# Patient Record
Sex: FEMALE | Race: WHITE | NOT HISPANIC OR LATINO | Employment: OTHER | URBAN - METROPOLITAN AREA
[De-identification: names, ages, dates, MRNs, and addresses within clinical notes are randomized per-mention and may not be internally consistent; named-entity substitution may affect disease eponyms.]

---

## 2017-01-04 ENCOUNTER — ALLSCRIPTS OFFICE VISIT (OUTPATIENT)
Dept: OTHER | Facility: OTHER | Age: 77
End: 2017-01-04

## 2017-01-04 ENCOUNTER — TRANSCRIBE ORDERS (OUTPATIENT)
Dept: ADMINISTRATIVE | Facility: HOSPITAL | Age: 77
End: 2017-01-04

## 2017-01-04 DIAGNOSIS — I71.2 THORACIC ANEURYSM WITHOUT MENTION OF RUPTURE: Primary | ICD-10-CM

## 2017-02-01 ENCOUNTER — GENERIC CONVERSION - ENCOUNTER (OUTPATIENT)
Dept: OTHER | Facility: OTHER | Age: 77
End: 2017-02-01

## 2017-02-04 DIAGNOSIS — I71.2 THORACIC AORTIC ANEURYSM WITHOUT RUPTURE (HCC): ICD-10-CM

## 2017-03-22 ENCOUNTER — HOSPITAL ENCOUNTER (OUTPATIENT)
Dept: CT IMAGING | Facility: HOSPITAL | Age: 77
Discharge: HOME | End: 2017-03-22
Attending: SURGERY
Payer: MEDICARE

## 2017-03-22 DIAGNOSIS — I71.2 THORACIC AORTIC ANEURYSM WITHOUT RUPTURE (HCC): ICD-10-CM

## 2017-03-22 PROCEDURE — 71250 CT THORAX DX C-: CPT

## 2017-04-19 ENCOUNTER — HOSPITAL ENCOUNTER (OUTPATIENT)
Dept: NON INVASIVE DIAGNOSTICS | Facility: CLINIC | Age: 77
Discharge: HOME | End: 2017-04-19
Payer: MEDICARE

## 2017-04-19 DIAGNOSIS — I71.2 THORACIC AORTIC ANEURYSM WITHOUT RUPTURE (HCC): ICD-10-CM

## 2017-04-19 PROCEDURE — 93978 VASCULAR STUDY: CPT

## 2017-05-10 ENCOUNTER — ALLSCRIPTS OFFICE VISIT (OUTPATIENT)
Dept: OTHER | Facility: OTHER | Age: 77
End: 2017-05-10

## 2017-06-14 ENCOUNTER — GENERIC CONVERSION - ENCOUNTER (OUTPATIENT)
Dept: OTHER | Facility: OTHER | Age: 77
End: 2017-06-14

## 2017-09-05 ENCOUNTER — GENERIC CONVERSION - ENCOUNTER (OUTPATIENT)
Dept: OTHER | Facility: OTHER | Age: 77
End: 2017-09-05

## 2017-11-10 DIAGNOSIS — I71.2 THORACIC AORTIC ANEURYSM WITHOUT RUPTURE (HCC): ICD-10-CM

## 2017-11-10 DIAGNOSIS — I65.23 OCCLUSION AND STENOSIS OF BILATERAL CAROTID ARTERIES: ICD-10-CM

## 2017-12-04 ENCOUNTER — GENERIC CONVERSION - ENCOUNTER (OUTPATIENT)
Dept: OTHER | Facility: OTHER | Age: 77
End: 2017-12-04

## 2017-12-04 ENCOUNTER — HOSPITAL ENCOUNTER (OUTPATIENT)
Dept: NON INVASIVE DIAGNOSTICS | Facility: CLINIC | Age: 77
Discharge: HOME | End: 2017-12-04
Payer: MEDICARE

## 2017-12-04 DIAGNOSIS — I65.23 OCCLUSION AND STENOSIS OF BILATERAL CAROTID ARTERIES: ICD-10-CM

## 2017-12-04 DIAGNOSIS — I71.2 THORACIC AORTIC ANEURYSM WITHOUT RUPTURE (HCC): ICD-10-CM

## 2017-12-04 PROCEDURE — 93880 EXTRACRANIAL BILAT STUDY: CPT

## 2018-01-11 NOTE — MISCELLANEOUS
Message  patient called with back pain for last 4 days, she claims this back pain is different than other times  I spoke to [facility] - LASHANDA LOVELL and she said unlikely that pain coming from AAA, but if continues or not relieved by pain meds, she should go to ER  I told patient   Active Problems    1  Abdominal aortic aneurysm without rupture (441 4) (I71 4)   2  Chronic kidney disease, stage III (moderate) (585 3) (N18 3)   3  History of AAA (abdominal aortic aneurysm) repair (V45 89) (Z98 89)   4  Thoracic aortic aneurysm without rupture (441 2) (I71 2)    Current Meds   1  AmLODIPine Besylate 5 MG Oral Tablet; Therapy: (Recorded:09Nov2015) to Recorded   2  Avapro 150 MG Oral Tablet (Irbesartan); Therapy: (Recorded:09Nov2015) to Recorded   3  Baclofen TABS; Therapy: ([phone]) to Recorded   4  Calcitriol 0 25 MCG Oral Capsule; Therapy: 28Dec2015 to Recorded   5  CoQ-10 CAPS; Therapy: (66 [phone] 64 22) to Recorded   6  FentaNYL 12 MCG/HR Transdermal Patch 72 Hour; Therapy: ([phone]) to Recorded   7  Fish Oil CAPS; Therapy: (66 [phone] 64 22) to Recorded   8  Lysine HCl - 1000 MG Oral Tablet; Therapy: (Recorded:11Jan2016) to Recorded   9  Metoprolol Tartrate 25 MG Oral Tablet; Therapy: ([phone]) to Recorded   10  Oxycodone-Acetaminophen 5-325 MG Oral Tablet; Therapy: ([phone]) to Recorded   11  Pantoprazole Sodium 40 MG PACK; Therapy: ([phone]) to Recorded   12  Rocaltrol CAPS (Calcitriol); Therapy: ([phone]) to Recorded   13  Sertraline HCl - 50 MG Oral Tablet; Therapy: ([phone]) to Recorded   14  Vitamin B Complex TABS; Therapy: (66 [phone] 64 22) to Recorded   15  Vitamin C CAPS; Therapy: (66 411 64 22) to Recorded   16  Vitamin D CAPS; Therapy: (66 411 64 22) to Recorded   17  Zovirax 400 MG Oral Tablet (Acyclovir); Therapy: (Recorded:09Nov2015) to Recorded    Allergies    1   Influenza Virus Vaccine Split   2  Iodine Crystals   3  Iodine SOLN   4  Levaquin TABS   5  NexIUM PACK   6  Rituxan   7  Sulfa Drugs    8  Adhesive Tape   9  Eggs   10  IV Dye   11  Latex   12  Other   13  Shellfish   14  Tape    Signatures   Electronically signed by :  Derrell Lopez, ; May 27 2016  2:05PM EST                       (Author)

## 2018-01-14 VITALS
BODY MASS INDEX: 31.99 KG/M2 | HEART RATE: 62 BPM | HEIGHT: 65 IN | RESPIRATION RATE: 16 BRPM | WEIGHT: 192 LBS | SYSTOLIC BLOOD PRESSURE: 130 MMHG | DIASTOLIC BLOOD PRESSURE: 80 MMHG

## 2018-01-18 NOTE — MISCELLANEOUS
Message  pt called ? if cleared for cta  informed her Dr Nel Marsh did not clear her, Dr Bonnie Toro is currently out of the office and we have sent him a task informing of same  she ? if she was discussed at case conference, Dr Bonnie Toro said she would be, maybe Dr Shara Taylor can advise on plan  She states she would rather go on dialysis then have a stroke or ruptured aneurysm  s/w Dr Shara Taylor - he rev films, Dr Bonnie Toro was not here monday and she was not discussed at case conf, he recommends pt wait until Dr Bonnie Toro rev neph recommendations and we will f/u w/ her next week  Pt notified of same  Active Problems    1  Abdominal aortic aneurysm, without rupture (441 4) (I71 4)   2  Carotid stenosis, asymptomatic, bilateral (433 10,433 30) (I65 23)   3  Chronic kidney disease, stage III (moderate) (585 3) (N18 3)   4  History of AAA (abdominal aortic aneurysm) repair (V45 89) (Z98 890)   5  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) (I77 1)   6  Thoracic aortic aneurysm without rupture (441 2) (I71 2)    Current Meds   1  AmLODIPine Besylate 2 5 MG Oral Tablet; Therapy: (Recorded:02Nov2016) to Recorded   2  Avapro 150 MG Oral Tablet (Irbesartan); Therapy: (Recorded:09Nov2015) to Recorded   3  Biotin 2500 MCG Oral Capsule; Therapy: (Recorded:02Nov2016) to Recorded   4  CoQ-10 CAPS; [facility] to Recorded   5  FentaNYL 12 MCG/HR Transdermal Patch 72 Hour; Therapy: ([phone]) to Recorded   6  FentaNYL 25 MCG/HR Transdermal Patch 72 Hour; Therapy: [id number] to Recorded   7  Fish Oil CAPS; Therapy: (Elsie Lowe) to Recorded   8  Lysine HCl - 1000 MG Oral Tablet; Therapy: (Recorded:11Jan2016) to Recorded   9  Metoprolol Tartrate 25 MG Oral Tablet; Therapy: ([phone]) to Recorded   10  Oxycodone-Acetaminophen 5-325 MG Oral Tablet; Therapy: ([phone]) to Recorded   11  Pepcid 20 MG Oral Tablet (Famotidine);     Therapy: (Recorded:02Nov2016) to Recorded   12  Rocaltrol CAPS (Calcitriol); Therapy: ([id number]) to Recorded   13  Sertraline HCl - 50 MG Oral Tablet; Therapy: ([id number]) to Recorded   14  Vitamin B Complex TABS; Therapy: ([phone]) to Recorded   15  Vitamin C CAPS; Therapy: ([phone]) to Recorded   16  Vitamin D CAPS; Therapy: ([phone]) to Recorded   17  Zovirax 400 MG Oral Tablet (Acyclovir); Therapy: (Recorded:09Nov2015) to Recorded    Allergies    1  Influenza Virus Vaccine Split   2  Iodine Crystals   3  Iodine SOLN   4  Levaquin TABS   5  NexIUM PACK   6  Rituxan   7  Sulfa Drugs    8  Adhesive Tape   9  Eggs   10  IV Dye   11  Latex   12  Other   13  Shellfish   14   Tape    Signatures   Electronically signed by : Viry Panda, ; Nov 10 2016  4:22PM EST                       (Author)

## 2018-05-17 DIAGNOSIS — I71.4 ABDOMINAL AORTIC ANEURYSM WITHOUT RUPTURE (HCC): Primary | ICD-10-CM

## 2018-08-10 ENCOUNTER — HOSPITAL ENCOUNTER (OUTPATIENT)
Dept: NON INVASIVE DIAGNOSTICS | Facility: CLINIC | Age: 78
Discharge: HOME | End: 2018-08-10
Payer: MEDICARE

## 2018-08-10 DIAGNOSIS — I71.4 ABDOMINAL AORTIC ANEURYSM WITHOUT RUPTURE (HCC): ICD-10-CM

## 2018-08-10 PROCEDURE — 93978 VASCULAR STUDY: CPT

## 2018-08-14 PROCEDURE — 93978 VASCULAR STUDY: CPT | Performed by: SURGERY

## 2018-08-22 ENCOUNTER — TELEPHONE (OUTPATIENT)
Dept: VASCULAR SURGERY | Facility: CLINIC | Age: 78
End: 2018-08-22

## 2018-08-22 DIAGNOSIS — I65.23 BILATERAL CAROTID ARTERY STENOSIS: Primary | ICD-10-CM

## 2018-08-29 ENCOUNTER — OFFICE VISIT (OUTPATIENT)
Dept: VASCULAR SURGERY | Facility: CLINIC | Age: 78
End: 2018-08-29
Payer: MEDICARE

## 2018-08-29 VITALS
BODY MASS INDEX: 33.15 KG/M2 | SYSTOLIC BLOOD PRESSURE: 130 MMHG | WEIGHT: 199 LBS | DIASTOLIC BLOOD PRESSURE: 78 MMHG | HEIGHT: 65 IN | TEMPERATURE: 98.9 F

## 2018-08-29 DIAGNOSIS — K55.1 CHRONIC MESENTERIC ISCHEMIA (HCC): ICD-10-CM

## 2018-08-29 DIAGNOSIS — K55.1 SUPERIOR MESENTERIC ARTERY STENOSIS (HCC): ICD-10-CM

## 2018-08-29 DIAGNOSIS — I71.4 ABDOMINAL AORTIC ANEURYSM (AAA) WITHOUT RUPTURE (HCC): Primary | ICD-10-CM

## 2018-08-29 DIAGNOSIS — I65.23 CAROTID STENOSIS, ASYMPTOMATIC, BILATERAL: ICD-10-CM

## 2018-08-29 DIAGNOSIS — N18.4 CKD (CHRONIC KIDNEY DISEASE) STAGE 4, GFR 15-29 ML/MIN (HCC): Chronic | ICD-10-CM

## 2018-08-29 DIAGNOSIS — I71.2 THORACIC AORTIC ANEURYSM WITHOUT RUPTURE (HCC): ICD-10-CM

## 2018-08-29 PROBLEM — I71.40 AAA (ABDOMINAL AORTIC ANEURYSM) (HCC): Status: ACTIVE | Noted: 2018-04-03

## 2018-08-29 PROBLEM — J43.9 PULMONARY EMPHYSEMA (HCC): Status: ACTIVE | Noted: 2018-04-03

## 2018-08-29 PROCEDURE — 99214 OFFICE O/P EST MOD 30 MIN: CPT | Performed by: SURGERY

## 2018-08-29 RX ORDER — AMLODIPINE BESYLATE 5 MG/1
5 TABLET ORAL DAILY
COMMUNITY
End: 2019-02-13

## 2018-08-29 RX ORDER — IRBESARTAN 150 MG/1
150 TABLET ORAL
COMMUNITY
End: 2019-02-13

## 2018-08-29 RX ORDER — ONDANSETRON 4 MG/1
4 TABLET, FILM COATED ORAL EVERY 6 HOURS PRN
COMMUNITY

## 2018-08-29 NOTE — PATIENT INSTRUCTIONS
During today's office visit we discussed the following:  Mesenteric artery occlusive disease possibly contributing to your postprandial abdominal pain although somewhat atypical for mesenteric ischemia with no significant weight loss/food fear etc   We discussed proceeding with mesenteric angiogram and its potential associated risks with your underlying pulmonary disease in  addition to the potential  risks inherent to the procedure itself such as  Puncture site complications, bleeding, infection, bowel ischemia from distal atheroembolization  We also discussed the possibility of coordinating/consolidating your overall vascular care  You are currently following up with [address] for your thoracoabdominal aneurysm however you also had a mesenteric duplex performed at [facility] where you mentioned the remainder of your doctors are located  If it is easier for you to follow up at [facility] we will attempt to coordinate care with the vascular group there if you so desire  With regards to your asymptomatic carotid disease you have an upcoming carotid duplex with us in October with follow-up office visit at which time we will review results as well as decision on whether to proceed with mesenteric angiogram with possible intervention of your mesenteric artery occlusive disease

## 2018-08-29 NOTE — ASSESSMENT & PLAN NOTE
Asymptomatic bilateral carotid artery stenosis without history of TIA/CVA  +50-69% R ICA and >70% L ICA stenosis on duplex but tortuous  L ICA    L ICA stenosis 50% on CTA December 2016   6 month surveillance carotid duplex scheduled for 10/3/2018     -continue surveillance carotid duplex q 6 months unless new changes on upcoming surveillance carotid duplex  -continue aspirin and statin therapy  -instructed to call 911 immediately for signs or symptoms of TIA/CVA

## 2018-08-29 NOTE — ASSESSMENT & PLAN NOTE
Recent mesenteric duplex at [facility] demonstrates elevated celiac artery stenoses  This was also confirmed on our [facility] follow-up duplex demonstrating a celiac artery stenosis with velocities of 643/182 in addition the SMA had elevated velocities of 381/83  Patient does report postprandial abdominal pain lasting approximately 0 5 hour  She reports approximately 3 lb weight loss however she mentions that this has been intentional   She denies any food fear   -we discussed the risk and benefits of mesenteric angiogram with possible celiac/SMA stenting in the face of underlying severe pulmonary insufficiency as well as chronic kidney disease     She has been oxygen dependent ever since her EVAR preformed in [place]   -she would like to discuss further with her  Zaynab Gamino and will follow up in the office in October following her scheduled carotid duplex (asymptomatic bilateral disease)

## 2018-08-29 NOTE — PROGRESS NOTES
AAA (abdominal aortic aneurysm) [facility])  82-year-old female with a history of CKD IV w/solitary functioning kidney, asymptomatic bilateral carotid artery stenosis, 5 0 cm descending thoracic/thoracoabdominal aneurysm followed at [facility] by Dr Dat Nava and infrarenal AAA, s/p EVAR Feb  '15 in [place]  EVAR duplex on 8/10/2018 demonstrates patent endograft without endoleak and decreased sac size of 3 85 cm   -continue surveillance with EVAR duplex in 1 year  -recommend ASA and statin therapy  -return to office in 1 year with EVAR duplex and carotid ultrasound for review of imaging studies  -instructed to contact the office in the interim with any questions, concerns or change in symptoms    Carotid stenosis, asymptomatic, bilateral  Asymptomatic bilateral carotid artery stenosis without history of TIA/CVA  +50-69% R ICA and >70% L ICA stenosis on duplex but tortuous  L ICA  L ICA stenosis 50% on CTA December 2016   6 month surveillance carotid duplex scheduled for 10/3/2018     -continue surveillance carotid duplex q 6 months unless new changes on upcoming surveillance carotid duplex  -continue aspirin and statin therapy  -instructed to call 911 immediately for signs or symptoms of TIA/CVA    Thoracic aortic aneurysm without rupture (HCC)  5 0 cm distal descending thoracic/thoracoabdominal aortic aneurysm  -followed by Dr Dat Nava at [facility]  -continue surveillance CT and follow-up as scheduled on 11/3/2018    Superior mesenteric artery stenosis [facility])  Recent mesenteric duplex at [facility] demonstrates elevated celiac artery stenoses  This was also confirmed on our EVAr surveillance  duplex demonstrating a celiac artery stenosis with velocities of 643/182 and SMA elevated velocities of 381/83  Patient does report postprandial abdominal pain lasting approximately 0 5 hour    She reports approximately 3 lb weight loss however she mentions that this has been intentional   She denies any food fear   -we discussed the risk and benefits of mesenteric angiogram with possible celiac/SMA stenting in the face of underlying severe pulmonary insufficiency as well as chronic kidney disease  She has been oxygen dependent ever since her EVAR preformed in [place]   -she would like to discuss further with her  Leanne García and will follow up in the office in October following her scheduled carotid duplex (asymptomatic bilateral disease)      Assessment/Plan   Diagnoses and all orders for this visit:    Abdominal aortic aneurysm (AAA) without rupture ([address] )  -     VAS evar endovascular aortic repair duplex; Future    Carotid stenosis, asymptomatic, bilateral  -     VAS carotid complete study; Future    Thoracic aortic aneurysm without rupture (HCC)    Superior mesenteric artery stenosis (HCC)    CKD (chronic kidney disease) stage 4, GFR 15-29 ml/min (HCC)    Other orders  -     amLODIPine (NORVASC) 5 mg tablet; Take 5 mg by mouth daily  -     ondansetron (ZOFRAN) 4 mg tablet; Take 4 mg by mouth every 8 (eight) hours as needed for nausea or vomiting  -     irbesartan (AVAPRO) 150 mg tablet; Take 150 mg by mouth daily at bedtime        No chief complaint on file  Subjective   Patient ID: Apollo Bautista is a 70 y o  female  Chief complaint: Rev EVAR 8/1018  Pt is scheduled for CV 10/3/18  Pt is c/o abd/ lower back pain  Pt denies HX of stroke  Pt denies TIA/ stroke symptoms  Pt denies one-sided weakness  Karla Avila returns to the office to discuss results of recent surveillance EvAR duplex  She originally had her abdominal aortic aneurysm repaired in [place]  We assumed her care when she moved to the area  She has been undergoing surveillance duplexes which have demonstrated stable aneurysm sac with no evidence of endoleak  Her chief complaint today in the office is postprandial abdominal pain lasting approximately 0 5 hour  She denies any nausea, vomiting or significant unintentional weight loss    A mesenteric duplex was recently ordered and performed at [facility] demonstrating celiac artery stenosis  Our EvAR duplex also suggested elevated velocities of both the celiac and SMA  She is oxygen dependent  She has chronic back pain  She is a poor  open surgical risk candidate  She also has known thoracoabdominal aortic aneurysm for which she is following up with [address] Catherine Do  She is scheduled for a noncontrast CT scan in November  The following portions of the patient's history were reviewed and updated as appropriate: allergies, current medications, past family history, past medical history, past social history, past surgical history and problem list     Review of Systems   HENT: Negative  Eyes: Negative  Respiratory: Positive for shortness of breath and wheezing  Cardiovascular: Positive for leg swelling  Gastrointestinal: Positive for abdominal pain and nausea  Endocrine: Negative  Genitourinary: Positive for difficulty urinating  Musculoskeletal: Positive for back pain  Skin: Negative  Allergic/Immunologic: Positive for food allergies  Neurological: Positive for dizziness, tremors, weakness and light-headedness  Hematological: Negative  Psychiatric/Behavioral: Positive for confusion  The patient is nervous/anxious          Patient Active Problem List   Diagnosis    Carotid stenosis, asymptomatic, bilateral    CKD (chronic kidney disease) stage 4, GFR 15-29 ml/min ([facility])    AAA (abdominal aortic aneurysm) ([facility])    Follicular lymphoma ([address] )    Hypertension    Pulmonary emphysema ([address] )    Superior mesenteric artery stenosis ([address] )    Thoracic aortic aneurysm without rupture ([address] )       Past Surgical History:   Procedure Laterality Date    ABDOMINAL AORTIC ANEURYSM REPAIR      stent placed    CARDIAC CATHETERIZATION      CATARACT EXTRACTION BILATERAL W/ ANTERIOR VITRECTOMY      COLONOSCOPY N/A 5/20/2016    Procedure: COLONOSCOPY;  Surgeon: Melvina Durbin MD; Location: [facility]; Service:     ESOPHAGOGASTRODUODENOSCOPY N/A 5/20/2016    Procedure: ESOPHAGOGASTRODUODENOSCOPY (EGD); Surgeon: Melvina Durbin MD;  Location: [facility]; Service:     ESOPHAGOGASTRODUODENOSCOPY N/A 4/7/2016    Procedure: ESOPHAGOGASTRODUODENOSCOPY (EGD); Surgeon: Melvina Durbin MD;  Location: [facility]; Service:     EXPLORATORY LAPAROTOMY      removal lymph nodes    HYSTERECTOMY      RADIOFREQUENCY ABLATION NERVES      lower back       No family history on file  Social History     Social History    Marital status:      Spouse name: N/A    Number of children: N/A    Years of education: N/A     Occupational History    Not on file  Social History Main Topics    Smoking status: Former Smoker    Smokeless tobacco: Not on file      Comment: pt states she quit 2012    Alcohol use No    Drug use: No    Sexual activity: Not on file     Other Topics Concern    Not on file     Social History Narrative    No narrative on file       Allergies   Allergen Reactions    Iodinated Diagnostic Agents Anaphylaxis    Ciprofloxacin      Achilles tendonitis  Quinolones also    Eggs Or Egg-Derived Products      Stomach pain    Fish-Derived Products      Stomach pain    Influenza Vaccines     Iodine      aslo ivp dye  shock    Latex      Rash itching     Medical Tape Itching    Nexium [Esomeprazole]      Headache,diarrhea,stomach pain    Other      Adhesive tears skin    Rituxan [Rituximab]      anaphylaxis    Sulfa Antibiotics      Severe headache         Current Outpatient Prescriptions:     acyclovir (ZOVIRAX) 5 % ointment, Apply topically as needed  , Disp: , Rfl:     amLODIPine (NORVASC) 5 mg tablet, Take 5 mg by mouth daily, Disp: , Rfl:     calcitriol (ROCALTROL) 0 25 mcg capsule, Take 0 25 mcg by mouth daily  , Disp: , Rfl:     calcium-vitamin D (OSCAL 500 + D) 500 mg-200 units per tablet, Take 1 tablet by mouth daily  , Disp: , Rfl:     Cholecalciferol (VITAMIN D-3 PO), Take 1 tablet by mouth daily  , Disp: , Rfl:     docusate sodium (COLACE) 100 mg capsule, Take 100 mg by mouth 2 (two) times a day, Disp: , Rfl:     famotidine (PEPCID) 20 mg tablet, Take 20 mg by mouth 2 (two) times a day as needed for heartburn (for heartburn), Disp: , Rfl:     irbesartan (AVAPRO) 150 mg tablet, Take 150 mg by mouth daily at bedtime, Disp: , Rfl:     L-Lysine 1000 MG TABS, Take by mouth daily  , Disp: , Rfl:     metoprolol tartrate (LOPRESSOR) 25 mg tablet, Take 50 mg by mouth 2 (two) times a day , Disp: , Rfl:     ondansetron (ZOFRAN) 4 mg tablet, Take 4 mg by mouth every 8 (eight) hours as needed for nausea or vomiting, Disp: , Rfl:     oxyCODONE-acetaminophen (PERCOCET) 5-325 mg per tablet, Take 1 tablet by mouth every 4 (four) hours as needed for moderate pain , Disp: , Rfl:     OXYGEN-HELIUM IN, Inhale Indications: 2 5 liters continuous  , Disp: , Rfl:     sertraline (ZOLOFT) 50 mg tablet, Take 50 mg by mouth daily  , Disp: , Rfl:     b complex vitamins tablet, Take 1 tablet by mouth daily Indications: with vitc/thiamine/riboflavin/niacin/vitb6/pantothenic acid , Disp: , Rfl:     Objective     Physical Exam:    General appearance: alert and oriented, in no acute distress  Skin: Skin color, texture, turgor normal  No rashes or lesions  Neurologic: Grossly normal  Head: Normocephalic, without obvious abnormality, atraumatic  Eyes: conjunctivae/corneas clear     Neck: no adenopathy, no carotid bruit, no JVD and supple, symmetrical, trachea midline  Back: negative  Lungs: O2 dependent  Chest wall: no tenderness  Heart: S1, S2 normal  Abdomen: soft, non-tender; bowel sounds normal; no masses,  no organomegaly  Extremities: extremities normal, warm and well-perfused; no cyanosis, clubbing, or edema    Pulse exam:  Radial: Right: 2+ Left[de-identified] 2+     Imaging:  EVAR duplex reviewed:   Stable aneurysm sac with no evidence of endoleak  Elevated velocities of both the celiac and superior mesenteric arteries

## 2018-08-29 NOTE — ASSESSMENT & PLAN NOTE
5 0 cm distal descending thoracic/thoracoabdominal aortic aneurysm  -followed by Dr Tena Brewer at [facility]  -continue surveillance CT and follow-up as scheduled on 11/3/2018

## 2018-08-29 NOTE — ASSESSMENT & PLAN NOTE
26-year-old female with a history of CKD IV w/solitary functioning kidney, asymptomatic bilateral carotid artery stenosis, 5 0 cm descending thoracic/thoracoabdominal aneurysm followed at [facility] by Dr Lei Phoenix and infrarenal AAA, s/p EVAR Feb  '15 in [facility]    EVAR duplex on 8/10/2018 demonstrates patent endograft without endoleak and decreased sac size of 3 85 cm   -continue surveillance with EVAR duplex in 1 year  -recommend ASA and statin therapy  -return to office in 1 year with EVAR duplex and carotid ultrasound for review of imaging studies  -instructed to contact the office in the interim with any questions, concerns or change in symptoms

## 2018-09-12 ENCOUNTER — TELEPHONE (OUTPATIENT)
Dept: VASCULAR SURGERY | Facility: CLINIC | Age: 78
End: 2018-09-12

## 2018-09-12 NOTE — TELEPHONE ENCOUNTER
Patient called and wanted to be sure dr Miesha Beckford knows that she has decided to have the mesenteric agram with possibile intervention done, however, she saw her family doctor and her K was elevated and her BP also was elevated  Her PCP wants to get her labs and BP under control and she will call us when ready to set up procedure  I will send this message to Dr Sean Pickens

## 2018-09-12 NOTE — TELEPHONE ENCOUNTER
Ok please keep me posted if patient is scheduled  She will require nephro clearance  She will also need to be prepped due to contrast allergy        Thanks  Megan Goel

## 2018-10-03 ENCOUNTER — HOSPITAL ENCOUNTER (OUTPATIENT)
Dept: NON INVASIVE DIAGNOSTICS | Facility: CLINIC | Age: 78
Discharge: HOME | End: 2018-10-03
Payer: MEDICARE

## 2018-10-03 DIAGNOSIS — I65.23 BILATERAL CAROTID ARTERY STENOSIS: ICD-10-CM

## 2018-10-03 PROCEDURE — 93880 EXTRACRANIAL BILAT STUDY: CPT

## 2018-10-05 PROCEDURE — 93880 EXTRACRANIAL BILAT STUDY: CPT | Performed by: SURGERY

## 2018-10-15 NOTE — TELEPHONE ENCOUNTER
Pt called today regarding mesenteric a-gram w/ possible intervention  The pt is scheduled to see her PCP tomorrow and will call us back w/ a final decision so we can start setting up the procedure  The pt stated that she saw her nephrologist, Dr Yanelis Bunch [facility]) today and they wanted us to call them regarding procedure  I called and left vm on nursing line  Will set up neph clearance tomorrow when pt calls back if ready to proceed, as requested by Dr Lisa Berkowitz  Pt will also need contrast allergy prep

## 2018-10-17 NOTE — TELEPHONE ENCOUNTER
Called pt for update since she went to see her PCP  Pt said she is not sure yet but will call us back

## 2018-10-30 NOTE — TELEPHONE ENCOUNTER
Called pt to f/u - she said she fell while in [place] 10/26  She is going to call her pcp to be evaluated  She states she is also having increased difficulty w/ her breathing  She will call us after she sees pcp

## 2018-11-27 NOTE — TELEPHONE ENCOUNTER
Called pt, she is cont to have difficulty w/ her breathing and has f/u ov w/ pcp 12/6, also trying to obtain ov w/ pulmonologist   She states it's difficult to get apt w/ pulmonologist, they have her on list for cancellation  If she decides to proceed w/ anything it would be after the holidays

## 2018-12-13 NOTE — TELEPHONE ENCOUNTER
Pt called yesterday to report that she did see pulmonologist and "they didn't think it was a problem with her lungs"  She states her PCP ordered a stress test, which she had last week and she stated she will get the results on Friday

## 2018-12-18 NOTE — TELEPHONE ENCOUNTER
I did call pcp Dr toribio's office and lmom asking to fax us  a recent ov note addressing that she is stable for the agram  If they do not have a note then I asked to let us know so we can send over a clearance form

## 2018-12-18 NOTE — TELEPHONE ENCOUNTER
Pt called and said that she wants to proceed with the agram  She said that her pcp Dr Willie Salinas said that her labs, bp and stress where all normal  Pt wants to schedule and appt with Dr Joya Nava to re-discuss the procedure  I told her that is fine  I will get the documentation from pcp in the mean time  Pt also aware that she will need nephro clearance  She said she is aware of this and did s/w her her nephrologist who is also named Dr Willie Salinas about this  She said he said he wanted to wait until after the pt sees Dr Joya Nava then would like to s/w Dr Joya Nava regarding the plan  Gave her to scheduling to set up the ov

## 2019-01-02 NOTE — TELEPHONE ENCOUNTER
Received a note from dr Edel Porter office and is stated patient is cleared from medical standpoint to proceed with vascular intervention regarding mesenteric atherosclerosis  But must be followed [id number] by nephrology  For CKD  Dr Lisa Mora had updated note per front cover page

## 2019-01-14 NOTE — PROGRESS NOTES
Carotid stenosis, asymptomatic, bilateral  Bilateral asymptomatic carotid stenosis  Continue surveillance with periodic carotid duplex    Thoracic aortic aneurysm without rupture ([facility] [address] )  Per Dr Anupam Dotson note [facility]) thoracic aortic aneurysm is relatively unchanged with plans to continue surveillance at this time with a repeat noncontrast CT of the chest in 1 year  Per Dr Anupam Dotson recommendation if patient ultimately meets criteria for TAAA repair she would be considered for enrollment into the [facility] trial     Superior mesenteric artery stenosis [facility])  Taisha Prince returns to the office to rediscuss mesenteric angiography with possible intervention  At last office visit patient reported postprandial abdominal pain and nausea with approximately 3 lb weight loss  This in the face of elevated celiac and SMA velocities on duplexes performed both at [facility] as well as here at [facility] prompted us to recommend mesenteric angiogram with possible stent intervention  At that time surgery was postponed due to elevated blood pressure and elevated potassium  She now reports that her blood pressure and potassium are better controlled with modification of her medication regimen  Interestingly,however she denies postprandial abdominal pain at this time  She reports that her appetite has been good and in fact she has "gained weight"  She does report occasional nausea  She denies any food fear  In light of her lack of symptoms consistent with chronic mesenteric ischemia especially considering the fact that she has gained weight in the interim I no longer think that mesenteric angiogram is indicated at present time  I have asked Taisha Prince keep a food diary with regards to exacerbation, Duration, intensity of her symptoms  Will plan on surveillance mesenteric duplex in 3 months with return office visit      Several other issues worth noting  Vicki's  was present during today's office visit    He was under the impression that the procedure would improve her breathing  Patient and  were counseled that procedure is unrelated to her overall pulmonary status and as such would not improve her breathing and/or decrease her oxygen requirements  Assessment/Plan   Diagnoses and all orders for this visit:    Superior mesenteric artery stenosis ([facility] [address] )  -     VAS celiac and/or mesenteric duplex; complete study; Future    Carotid stenosis, asymptomatic, bilateral    Thoracic aortic aneurysm without rupture ([facility] [address] )    Other orders  -     aspirin (ECOTRIN LOW STRENGTH) 81 mg EC tablet; Take 81 mg by mouth daily        No chief complaint on file  Subjective   Patient ID: Tika Artis is a 70 y o  female  Chief complaint: pt is here to rediscuss mesenteric Jada Carroll returns to the office to discuss once again possible mesenteric angiography with intervention  She was previously noted to have postprandial abdominal pain with several lb weight loss at last office visit  During that time we had recommended the above-mentioned procedure however her nephrologist was concerned due to her uncontrolled blood pressure and hyperkalemia  This is not reportedly better controlled  Unfortunately in the interim since last office visit she also sustained a fall while down in [place]  She has undergone a stress test which was reportedly unremarkable  She unfortunately continues to require oxygen via nasal cannula around the clock  She was told that her lungs are fine and not sure why she continues to require oxygen  With respect to her abdominal pain patient reports essentially resolution of her postprandial abdominal pain  She reports that she is eating more and in fact has gained weight  She denies any food fear          The following portions of the patient's history were reviewed and updated as appropriate: allergies, current medications, past family history, past medical history, past social history, past surgical history and problem list     Review of Systems   Constitutional: Positive for chills  HENT: Positive for hearing loss  Eyes: Negative  Respiratory: Positive for shortness of breath  Cardiovascular: Negative  Gastrointestinal: Negative  Endocrine: Negative  Genitourinary: Negative  Musculoskeletal:        Leg cramping  Leg pain   Skin: Negative  Allergic/Immunologic: Negative  Neurological: Positive for dizziness, weakness and light-headedness  Hematological: Negative  Psychiatric/Behavioral: Positive for sleep disturbance  I have personally reviewed the ROS entered by MA and agree as documented  Patient Active Problem List   Diagnosis    Carotid stenosis, asymptomatic, bilateral    CKD (chronic kidney disease) stage 4, GFR 15-29 ml/min ([facility]    AAA (abdominal aortic aneurysm) ([facility])    Follicular lymphoma ([address] )    Hypertension    Pulmonary emphysema ([facility] [address] )    Superior mesenteric artery stenosis ([address] )    Thoracic aortic aneurysm without rupture ([facility] [address] )    Chronic mesenteric ischemia ([facility] [address] )       Past Surgical History:   Procedure Laterality Date    ABDOMINAL AORTIC ANEURYSM REPAIR      stent placed    CARDIAC CATHETERIZATION      CATARACT EXTRACTION BILATERAL W/ ANTERIOR VITRECTOMY      COLONOSCOPY N/A 5/20/2016    Procedure: COLONOSCOPY;  Surgeon: Ede Mac MD;  [facility]: [facility]; Service:     ESOPHAGOGASTRODUODENOSCOPY N/A 5/20/2016    Procedure: ESOPHAGOGASTRODUODENOSCOPY (EGD); Surgeon: Ede Mac MD;  [facility]; Service:     ESOPHAGOGASTRODUODENOSCOPY N/A 4/7/2016    Procedure: ESOPHAGOGASTRODUODENOSCOPY (EGD); Surgeon: Ede Mac MD;  [facility]; Service:     EXPLORATORY LAPAROTOMY      removal lymph nodes    HYSTERECTOMY      RADIOFREQUENCY ABLATION NERVES      lower back       History reviewed  No pertinent family history      Social History     Social History    Marital status:      Spouse name: N/A    Number of children: N/A    Years of education: N/A     Occupational History    Not on file  Social History Main Topics    Smoking status: Former Smoker    Smokeless tobacco: Never Used      Comment: pt states she quit 2012    Alcohol use No    Drug use: No    Sexual activity: Not on file     Other Topics Concern    Not on file     Social History Narrative    No narrative on file       Allergies   Allergen Reactions    Iodinated Diagnostic Agents Anaphylaxis    Ciprofloxacin      Achilles tendonitis  Quinolones also    Eggs Or Egg-Derived Products      Stomach pain    Fish-Derived Products      Stomach pain    Influenza Vaccines     Iodine      aslo ivp dye  shock    Latex      Rash itching     Medical Tape Itching    Nexium [Esomeprazole]      Headache,diarrhea,stomach pain    Other      Adhesive tears skin    Rituxan [Rituximab]      anaphylaxis    Sulfa Antibiotics      Severe headache         Current Outpatient Prescriptions:     amLODIPine (NORVASC) 5 mg tablet, Take 5 mg by mouth daily, Disp: , Rfl:     aspirin (ECOTRIN LOW STRENGTH) 81 mg EC tablet, Take 81 mg by mouth daily, Disp: , Rfl:     calcitriol (ROCALTROL) 0 25 mcg capsule, Take 0 25 mcg by mouth daily  , Disp: , Rfl:     calcium-vitamin D (OSCAL 500 + D) 500 mg-200 units per tablet, Take 1 tablet by mouth daily  , Disp: , Rfl:     Cholecalciferol (VITAMIN D-3 PO), Take 1 tablet by mouth daily  , Disp: , Rfl:     famotidine (PEPCID) 20 mg tablet, Take 20 mg by mouth 2 (two) times a day as needed for heartburn (for heartburn), Disp: , Rfl:     L-Lysine 1000 MG TABS, Take by mouth daily  , Disp: , Rfl:     ondansetron (ZOFRAN) 4 mg tablet, Take 4 mg by mouth every 8 (eight) hours as needed for nausea or vomiting, Disp: , Rfl:     oxyCODONE-acetaminophen (PERCOCET) 5-325 mg per tablet, Take 1 tablet by mouth every 4 (four) hours as needed for moderate pain , Disp: , Rfl:     acyclovir (ZOVIRAX) 5 % ointment, Apply topically as needed  , Disp: , Rfl:     b complex vitamins tablet, Take 1 tablet by mouth daily Indications: with vitc/thiamine/riboflavin/niacin/vitb6/pantothenic acid , Disp: , Rfl:     docusate sodium (COLACE) 100 mg capsule, Take 100 mg by mouth 2 (two) times a day, Disp: , Rfl:     irbesartan (AVAPRO) 150 mg tablet, Take 150 mg by mouth daily at bedtime, Disp: , Rfl:     metoprolol tartrate (LOPRESSOR) 25 mg tablet, Take 50 mg by mouth 2 (two) times a day , Disp: , Rfl:     OXYGEN-HELIUM IN, Inhale Indications: 2 5 liters continuous  , Disp: , Rfl:     sertraline (ZOLOFT) 50 mg tablet, Take 50 mg by mouth daily  , Disp: , Rfl:     Objective         Physical Exam:    General appearance: alert and oriented, in no acute distress  Skin: Skin color, texture, turgor normal  No rashes or lesions  Neurologic: Grossly normal  Head: Normocephalic, without obvious abnormality, atraumatic  Eyes: conjunctivae/corneas clear  PERRL, EOM's intact  Neck: no adenopathy, no carotid bruit, no JVD and supple, symmetrical, trachea midline  Back:  No CVA tenderness    Lungs: clear to auscultation bilaterally  Chest wall: no tenderness  Heart: regular rate and rhythm, S1, S2 normal, no murmur, click, rub or gallop  Abdomen: soft, non-tender; bowel sounds normal; no masses,  no organomegaly  Extremities: extremities normal, warm and well-perfused; no cyanosis, clubbing, or edema    Pulse exam:  Radial: Right: 2+ Left[de-identified] 2+

## 2019-01-16 ENCOUNTER — OFFICE VISIT (OUTPATIENT)
Dept: VASCULAR SURGERY | Facility: CLINIC | Age: 79
End: 2019-01-16
Payer: MEDICARE

## 2019-01-16 VITALS
DIASTOLIC BLOOD PRESSURE: 78 MMHG | HEIGHT: 65 IN | TEMPERATURE: 98.3 F | BODY MASS INDEX: 33.12 KG/M2 | SYSTOLIC BLOOD PRESSURE: 126 MMHG

## 2019-01-16 DIAGNOSIS — I71.2 THORACIC AORTIC ANEURYSM WITHOUT RUPTURE (HCC): ICD-10-CM

## 2019-01-16 DIAGNOSIS — I65.23 CAROTID STENOSIS, ASYMPTOMATIC, BILATERAL: ICD-10-CM

## 2019-01-16 DIAGNOSIS — K55.1 SUPERIOR MESENTERIC ARTERY STENOSIS (HCC): Primary | ICD-10-CM

## 2019-01-16 PROCEDURE — 99214 OFFICE O/P EST MOD 30 MIN: CPT | Performed by: SURGERY

## 2019-01-16 RX ORDER — ASPIRIN 81 MG/1
81 TABLET ORAL DAILY
COMMUNITY

## 2019-01-16 NOTE — ASSESSMENT & PLAN NOTE
Arabella Downs returns to the office to rediscuss mesenteric angiography with possible intervention  At last office visit patient reported postprandial abdominal pain and nausea with approximately 3 lb weight loss  This in the face of elevated celiac and SMA velocities on duplexes performed both at [facility] as well as here at [facility] prompted us to recommend mesenteric angiogram with possible stent intervention  At that time surgery was postponed due to elevated blood pressure and elevated potassium  She now reports that her blood pressure and potassium are better controlled with modification of her medication regimen  Interestingly,owever she denies postprandial abdominal pain at this time  She reports that her appetite has been good and in fact she has gained weight  She does report occasional nausea  She denies any food fear  In light of her lack of symptoms consistent with chronic mesenteric ischemia especially considering the fact that she has gained weight in the interim I no longer think that mesenteric angiogram is indicated at present time  I have asked Arabella Downs keep a food diary with regards to exacerbation, Duration, intensity of her symptoms  Will plan on surveillance mesenteric duplex in 3 months with return office visit      Several other issues worth noting  Vicki's  was present during today's office visit  He was under the impression that the procedure would improve her breathing  Patient and  were counseled that procedure is unrelated to her overall pulmonary status and as such would not improve her breathing and decrease her oxygen requirements

## 2019-01-16 NOTE — PATIENT INSTRUCTIONS
As you reported today in the office your symptoms have improved in the interim since last office visit  In the absence of pain after eating, food fear and significant weight loss your symptoms are not consistent with chronic mesenteric ischemia and as such do not warrant intervention at present time  Please keep a diary moving forward of the foods you eat that particularly ellicit any abdominal symptom: ( pain, nausea, bloating etc)  Also weigh yourself periodically to determine any weight loss    Please call our office with any questions and/or concerns

## 2019-01-16 NOTE — ASSESSMENT & PLAN NOTE
Per Dr Nito Gonzales note [facility]) thoracic aortic aneurysm is relatively unchanged with plans to continue surveillance at this time

## 2019-01-28 DIAGNOSIS — I71.4 ABDOMINAL AORTIC ANEURYSM WITHOUT RUPTURE (HCC): Primary | ICD-10-CM

## 2019-02-11 ENCOUNTER — TELEPHONE (OUTPATIENT)
Dept: VASCULAR SURGERY | Facility: CLINIC | Age: 79
End: 2019-02-11

## 2019-02-11 NOTE — TELEPHONE ENCOUNTER
Pt called regarding her symptoms  She states that her abdominal pain has returned and has worsened then previously  She has pain 1/2 hour after eating that las a few hours and is constantly constipated  She said that her previously recommended surgery was postponed since she was feeling better and was told to call if her symptoms worsened  I sent e-mail to [facility] to see if she needs OV vs  Surgery scheduled

## 2019-02-11 NOTE — TELEPHONE ENCOUNTER
[facility] responded to email for the pt to be seen for OV  I called her  There was NA and a message was left on her machine

## 2019-02-13 ENCOUNTER — ANESTHESIA EVENT (OUTPATIENT)
Dept: PERIOP | Facility: HOSPITAL | Age: 79
DRG: 394 | End: 2019-02-13
Payer: MEDICARE

## 2019-02-13 ENCOUNTER — TELEPHONE (OUTPATIENT)
Dept: CARDIAC SURGERY | Facility: HOSPITAL | Age: 79
End: 2019-02-13

## 2019-02-13 ENCOUNTER — HOSPITAL ENCOUNTER (INPATIENT)
Facility: HOSPITAL | Age: 79
LOS: 2 days | Discharge: HOME | DRG: 394 | End: 2019-02-15
Attending: SURGERY | Admitting: SURGERY
Payer: MEDICARE

## 2019-02-13 ENCOUNTER — TELEPHONE (OUTPATIENT)
Dept: VASCULAR SURGERY | Facility: CLINIC | Age: 79
End: 2019-02-13

## 2019-02-13 DIAGNOSIS — N18.4 CKD (CHRONIC KIDNEY DISEASE) STAGE 4, GFR 15-29 ML/MIN (HCC): Primary | ICD-10-CM

## 2019-02-13 DIAGNOSIS — K55.1 MESENTERIC ARTERY STENOSIS (HCC): ICD-10-CM

## 2019-02-13 DIAGNOSIS — K55.1 SUPERIOR MESENTERIC ARTERY STENOSIS (HCC): Primary | ICD-10-CM

## 2019-02-13 DIAGNOSIS — N18.4 CKD (CHRONIC KIDNEY DISEASE) STAGE 4, GFR 15-29 ML/MIN (HCC): Primary | Chronic | ICD-10-CM

## 2019-02-13 DIAGNOSIS — K55.1 MESENTERIC ARTERY STENOSIS (HCC): Primary | ICD-10-CM

## 2019-02-13 DIAGNOSIS — K55.1 CHRONIC MESENTERIC ISCHEMIA (HCC): ICD-10-CM

## 2019-02-13 DIAGNOSIS — N18.4 CKD (CHRONIC KIDNEY DISEASE) STAGE 4, GFR 15-29 ML/MIN (HCC): ICD-10-CM

## 2019-02-13 RX ORDER — METHYLPREDNISOLONE 16 MG/1
32 TABLET ORAL ONCE
Status: COMPLETED | OUTPATIENT
Start: 2019-02-14 | End: 2019-02-14

## 2019-02-13 RX ORDER — HEPARIN SODIUM 5000 [USP'U]/ML
5000 INJECTION, SOLUTION INTRAVENOUS; SUBCUTANEOUS EVERY 8 HOURS SCHEDULED
Status: DISCONTINUED | OUTPATIENT
Start: 2019-02-13 | End: 2019-02-15 | Stop reason: HOSPADM

## 2019-02-13 RX ORDER — DIPHENHYDRAMINE HCL 25 MG
50 TABLET ORAL ONCE
Status: COMPLETED | OUTPATIENT
Start: 2019-02-14 | End: 2019-02-14

## 2019-02-13 RX ORDER — OXYCODONE HYDROCHLORIDE AND ACETAMINOPHEN 5; 325 MG/1; MG/1
1 TABLET ORAL EVERY 4 HOURS PRN
Status: DISCONTINUED | OUTPATIENT
Start: 2019-02-13 | End: 2019-02-15 | Stop reason: HOSPADM

## 2019-02-13 RX ORDER — FAMOTIDINE 20 MG/1
20 TABLET, FILM COATED ORAL DAILY PRN
Status: DISCONTINUED | OUTPATIENT
Start: 2019-02-13 | End: 2019-02-15 | Stop reason: HOSPADM

## 2019-02-13 RX ORDER — METHYLPREDNISOLONE 16 MG/1
32 TABLET ORAL ONCE
Status: COMPLETED | OUTPATIENT
Start: 2019-02-13 | End: 2019-02-13

## 2019-02-13 RX ORDER — SODIUM CHLORIDE 9 MG/ML
100 INJECTION, SOLUTION INTRAVENOUS CONTINUOUS
Status: DISCONTINUED | OUTPATIENT
Start: 2019-02-13 | End: 2019-02-14

## 2019-02-13 RX ORDER — METHYLPREDNISOLONE 32 MG/1
32 TABLET ORAL 2 TIMES DAILY
Qty: 4 TABLET | Refills: 0 | Status: SHIPPED | OUTPATIENT
Start: 2019-02-13 | End: 2019-02-15 | Stop reason: HOSPADM

## 2019-02-13 RX ORDER — CALCIUM CARBONATE 200(500)MG
500 TABLET,CHEWABLE ORAL 2 TIMES DAILY PRN
Status: DISCONTINUED | OUTPATIENT
Start: 2019-02-13 | End: 2019-02-15 | Stop reason: HOSPADM

## 2019-02-13 RX ORDER — ASPIRIN 81 MG/1
81 TABLET ORAL DAILY
Status: DISCONTINUED | OUTPATIENT
Start: 2019-02-14 | End: 2019-02-15 | Stop reason: HOSPADM

## 2019-02-13 RX ORDER — AMLODIPINE BESYLATE 5 MG/1
5 TABLET ORAL 2 TIMES DAILY
Status: DISCONTINUED | OUTPATIENT
Start: 2019-02-13 | End: 2019-02-14

## 2019-02-13 RX ORDER — LABETALOL 100 MG/1
150 TABLET, FILM COATED ORAL 3 TIMES DAILY
Status: DISCONTINUED | OUTPATIENT
Start: 2019-02-14 | End: 2019-02-14

## 2019-02-13 RX ORDER — ONDANSETRON 4 MG/1
4 TABLET, ORALLY DISINTEGRATING ORAL EVERY 4 HOURS PRN
Status: DISCONTINUED | OUTPATIENT
Start: 2019-02-13 | End: 2019-02-14

## 2019-02-13 RX ORDER — METHYLPREDNISOLONE 16 MG/1
32 TABLET ORAL 2 TIMES DAILY
Status: DISCONTINUED | OUTPATIENT
Start: 2019-02-13 | End: 2019-02-13

## 2019-02-13 RX ORDER — DIPHENHYDRAMINE HCL 25 MG
50 TABLET ORAL
Status: DISCONTINUED | OUTPATIENT
Start: 2019-02-13 | End: 2019-02-13

## 2019-02-13 RX ADMIN — METHYLPREDNISOLONE 32 MG: 16 TABLET ORAL at 23:06

## 2019-02-13 RX ADMIN — SODIUM CHLORIDE 75 ML/HR: 0.9 INJECTION, SOLUTION INTRAVENOUS at 23:35

## 2019-02-13 RX ADMIN — HEPARIN SODIUM 5000 UNITS: 5000 INJECTION INTRAVENOUS; SUBCUTANEOUS at 23:15

## 2019-02-13 NOTE — TELEPHONE ENCOUNTER
Called Dr Guicho Reddy office regarding recommendations for hydration prior to potential procedure tomorrow  Nurse will send message to Dr Shea Rushing and call me back later

## 2019-02-13 NOTE — TELEPHONE ENCOUNTER
Checked with PACs on status of bed, tentative bed found on [facility], still waiting for confirmation  Requested that they call either way if bed unable to be found by tonight  Patient called and notified of situation

## 2019-02-13 NOTE — TELEPHONE ENCOUNTER
Spoke with Dr Angel Tamez personally about patient's reported symptoms, case tomorrow, and renal concerns  Dr Angel Tamez states he is ok with procedure going through tomorrow as long as patient receives "23 hours of total hydration  12 pre-procedure hydration, and 8-12 hours of post procedure hydration  75 cc's/hour  She should get a BMP checked on Monday " Thanked Dr Angel Tamez, and he stated he will call the patient to let her know the plan and he is ok with proceeding under these circumstances  I then called Dr Rayo Sanchez about this, relayed Dr Doreen Bello recommendations, Dr Rayo Sanchez is in agreement to admit patient to our service and begin hydration tonight  I will call PACS and set up admission

## 2019-02-13 NOTE — TELEPHONE ENCOUNTER
Patient called and plan reviewed  Patient much more comfortable after talking to Dr Zuhair Suarez  Explained the process of direct admission   I will now call PACs about setting up admission

## 2019-02-13 NOTE — TELEPHONE ENCOUNTER
Spoke with Rosi Madden about patient's admission and she will call me back later if unable to find a bed for patient

## 2019-02-13 NOTE — TELEPHONE ENCOUNTER
Patient is scheduled for a mesenteric agram tomorrow 2/14/19 at [facility] with Dr Bruce Yadav and Dr Harshal Flores  Patient states nephrology is requesting 12 hours of pre hydration  I called Dr Angie Mcgowan office at [phone] and spoke with Kayleen Cunha  Went over the information for agrjoaquin and advised her what I needed and requested it to be returned urgently  Faxed hydration orders to [phone]  Also emailed Bulmaro Covarrubias and Dr Bruce Yadav this information

## 2019-02-14 ENCOUNTER — APPOINTMENT (OUTPATIENT)
Dept: RADIOLOGY | Facility: HOSPITAL | Age: 79
DRG: 394 | End: 2019-02-14
Payer: MEDICARE

## 2019-02-14 ENCOUNTER — ANESTHESIA (OUTPATIENT)
Dept: PERIOP | Facility: HOSPITAL | Age: 79
DRG: 394 | End: 2019-02-14
Payer: MEDICARE

## 2019-02-14 LAB
ABO GROUP BLD: NORMAL
ANION GAP SERPL CALCULATED.3IONS-SCNC: 8 MMOL/L (ref 4–13)
APTT PPP: 29 SECONDS (ref 26–38)
BASOPHILS # BLD AUTO: 0.02 THOUSANDS/ΜL (ref 0–0.1)
BASOPHILS NFR BLD AUTO: 1 % (ref 0–1)
BLD GP AB SCN SERPL QL: NEGATIVE
BUN SERPL-MCNC: 35 MG/DL (ref 5–25)
CALCIUM SERPL-MCNC: 9.2 MG/DL (ref 8.3–10.1)
CHLORIDE SERPL-SCNC: 104 MMOL/L (ref 100–108)
CO2 SERPL-SCNC: 24 MMOL/L (ref 21–32)
CREAT SERPL-MCNC: 1.94 MG/DL (ref 0.6–1.3)
EOSINOPHIL # BLD AUTO: 0.01 THOUSAND/ΜL (ref 0–0.61)
EOSINOPHIL NFR BLD AUTO: 0 % (ref 0–6)
ERYTHROCYTE [DISTWIDTH] IN BLOOD BY AUTOMATED COUNT: 13.7 % (ref 11.6–15.1)
GFR SERPL CREATININE-BSD FRML MDRD: 25 ML/MIN/1.73SQ M
GLUCOSE SERPL-MCNC: 139 MG/DL (ref 65–140)
HCT VFR BLD AUTO: 33.3 % (ref 34.8–46.1)
HGB BLD-MCNC: 10.4 G/DL (ref 11.5–15.4)
IMM GRANULOCYTES # BLD AUTO: 0.02 THOUSAND/UL (ref 0–0.2)
IMM GRANULOCYTES NFR BLD AUTO: 1 % (ref 0–2)
INR PPP: 0.98 (ref 0.86–1.17)
LYMPHOCYTES # BLD AUTO: 0.39 THOUSANDS/ΜL (ref 0.6–4.47)
LYMPHOCYTES NFR BLD AUTO: 10 % (ref 14–44)
MCH RBC QN AUTO: 27.7 PG (ref 26.8–34.3)
MCHC RBC AUTO-ENTMCNC: 31.2 G/DL (ref 31.4–37.4)
MCV RBC AUTO: 89 FL (ref 82–98)
MONOCYTES # BLD AUTO: 0.08 THOUSAND/ΜL (ref 0.17–1.22)
MONOCYTES NFR BLD AUTO: 2 % (ref 4–12)
NEUTROPHILS # BLD AUTO: 3.22 THOUSANDS/ΜL (ref 1.85–7.62)
NEUTS SEG NFR BLD AUTO: 86 % (ref 43–75)
NRBC BLD AUTO-RTO: 0 /100 WBCS
PLATELET # BLD AUTO: 120 THOUSANDS/UL (ref 149–390)
PMV BLD AUTO: 10.2 FL (ref 8.9–12.7)
POTASSIUM SERPL-SCNC: 4.6 MMOL/L (ref 3.5–5.3)
PROTHROMBIN TIME: 13.1 SECONDS (ref 11.8–14.2)
RBC # BLD AUTO: 3.75 MILLION/UL (ref 3.81–5.12)
RH BLD: POSITIVE
SODIUM SERPL-SCNC: 136 MMOL/L (ref 136–145)
SPECIMEN EXPIRATION DATE: NORMAL
WBC # BLD AUTO: 3.74 THOUSAND/UL (ref 4.31–10.16)

## 2019-02-14 PROCEDURE — BD12YZZ FLUOROSCOPY OF STOMACH USING OTHER CONTRAST: ICD-10-PCS | Performed by: SURGERY

## 2019-02-14 PROCEDURE — 85730 THROMBOPLASTIN TIME PARTIAL: CPT | Performed by: SURGERY

## 2019-02-14 PROCEDURE — 80048 BASIC METABOLIC PNL TOTAL CA: CPT | Performed by: SURGERY

## 2019-02-14 PROCEDURE — 36245 INS CATH ABD/L-EXT ART 1ST: CPT | Performed by: SURGERY

## 2019-02-14 PROCEDURE — C1769 GUIDE WIRE: HCPCS

## 2019-02-14 PROCEDURE — 86850 RBC ANTIBODY SCREEN: CPT | Performed by: SURGERY

## 2019-02-14 PROCEDURE — C1887 CATHETER, GUIDING: HCPCS | Performed by: SURGERY

## 2019-02-14 PROCEDURE — 86900 BLOOD TYPING SEROLOGIC ABO: CPT | Performed by: SURGERY

## 2019-02-14 PROCEDURE — 86920 COMPATIBILITY TEST SPIN: CPT

## 2019-02-14 PROCEDURE — C1769 GUIDE WIRE: HCPCS | Performed by: SURGERY

## 2019-02-14 PROCEDURE — C1894 INTRO/SHEATH, NON-LASER: HCPCS | Performed by: SURGERY

## 2019-02-14 PROCEDURE — 85025 COMPLETE CBC W/AUTO DIFF WBC: CPT | Performed by: SURGERY

## 2019-02-14 PROCEDURE — 99222 1ST HOSP IP/OBS MODERATE 55: CPT | Performed by: INTERNAL MEDICINE

## 2019-02-14 PROCEDURE — B4141ZZ FLUOROSCOPY OF SUPERIOR MESENTERIC ARTERY USING LOW OSMOLAR CONTRAST: ICD-10-PCS | Performed by: SURGERY

## 2019-02-14 PROCEDURE — 85610 PROTHROMBIN TIME: CPT | Performed by: SURGERY

## 2019-02-14 PROCEDURE — 75726 ARTERY X-RAYS ABDOMEN: CPT | Performed by: SURGERY

## 2019-02-14 PROCEDURE — 86901 BLOOD TYPING SEROLOGIC RH(D): CPT | Performed by: SURGERY

## 2019-02-14 PROCEDURE — 76937 US GUIDE VASCULAR ACCESS: CPT

## 2019-02-14 RX ORDER — GLYCOPYRROLATE 0.2 MG/ML
INJECTION INTRAMUSCULAR; INTRAVENOUS AS NEEDED
Status: DISCONTINUED | OUTPATIENT
Start: 2019-02-14 | End: 2019-02-14 | Stop reason: SURG

## 2019-02-14 RX ORDER — ACETYLCYSTEINE 200 MG/ML
1200 SOLUTION ORAL; RESPIRATORY (INHALATION) 2 TIMES DAILY
Status: COMPLETED | OUTPATIENT
Start: 2019-02-14 | End: 2019-02-15

## 2019-02-14 RX ORDER — SODIUM CHLORIDE, SODIUM LACTATE, POTASSIUM CHLORIDE, CALCIUM CHLORIDE 600; 310; 30; 20 MG/100ML; MG/100ML; MG/100ML; MG/100ML
75 INJECTION, SOLUTION INTRAVENOUS CONTINUOUS
Status: DISCONTINUED | OUTPATIENT
Start: 2019-02-14 | End: 2019-02-15 | Stop reason: HOSPADM

## 2019-02-14 RX ORDER — LABETALOL 100 MG/1
150 TABLET, FILM COATED ORAL 3 TIMES DAILY
Status: DISCONTINUED | OUTPATIENT
Start: 2019-02-14 | End: 2019-02-15 | Stop reason: HOSPADM

## 2019-02-14 RX ORDER — ONDANSETRON 2 MG/ML
INJECTION INTRAMUSCULAR; INTRAVENOUS AS NEEDED
Status: DISCONTINUED | OUTPATIENT
Start: 2019-02-14 | End: 2019-02-14 | Stop reason: SURG

## 2019-02-14 RX ORDER — ROCURONIUM BROMIDE 10 MG/ML
INJECTION, SOLUTION INTRAVENOUS AS NEEDED
Status: DISCONTINUED | OUTPATIENT
Start: 2019-02-14 | End: 2019-02-14 | Stop reason: SURG

## 2019-02-14 RX ORDER — PROPOFOL 10 MG/ML
INJECTION, EMULSION INTRAVENOUS AS NEEDED
Status: DISCONTINUED | OUTPATIENT
Start: 2019-02-14 | End: 2019-02-14 | Stop reason: SURG

## 2019-02-14 RX ORDER — SODIUM CHLORIDE 9 MG/ML
100 INJECTION, SOLUTION INTRAVENOUS CONTINUOUS
Status: DISPENSED | OUTPATIENT
Start: 2019-02-14 | End: 2019-02-15

## 2019-02-14 RX ORDER — OXYCODONE HYDROCHLORIDE AND ACETAMINOPHEN 5; 325 MG/1; MG/1
1 TABLET ORAL EVERY 4 HOURS PRN
Status: DISCONTINUED | OUTPATIENT
Start: 2019-02-14 | End: 2019-02-14 | Stop reason: SDUPTHER

## 2019-02-14 RX ORDER — HEPARIN SODIUM 1000 [USP'U]/ML
INJECTION, SOLUTION INTRAVENOUS; SUBCUTANEOUS AS NEEDED
Status: DISCONTINUED | OUTPATIENT
Start: 2019-02-14 | End: 2019-02-14 | Stop reason: SURG

## 2019-02-14 RX ORDER — FENTANYL CITRATE 50 UG/ML
INJECTION, SOLUTION INTRAMUSCULAR; INTRAVENOUS AS NEEDED
Status: DISCONTINUED | OUTPATIENT
Start: 2019-02-14 | End: 2019-02-14 | Stop reason: SURG

## 2019-02-14 RX ORDER — NEOSTIGMINE METHYLSULFATE 1 MG/ML
INJECTION INTRAVENOUS AS NEEDED
Status: DISCONTINUED | OUTPATIENT
Start: 2019-02-14 | End: 2019-02-14 | Stop reason: SURG

## 2019-02-14 RX ORDER — FENTANYL CITRATE/PF 50 MCG/ML
25 SYRINGE (ML) INJECTION
Status: DISCONTINUED | OUTPATIENT
Start: 2019-02-14 | End: 2019-02-14 | Stop reason: HOSPADM

## 2019-02-14 RX ORDER — PROTAMINE SULFATE 10 MG/ML
INJECTION, SOLUTION INTRAVENOUS AS NEEDED
Status: DISCONTINUED | OUTPATIENT
Start: 2019-02-14 | End: 2019-02-14 | Stop reason: SURG

## 2019-02-14 RX ORDER — SODIUM CHLORIDE, SODIUM LACTATE, POTASSIUM CHLORIDE, CALCIUM CHLORIDE 600; 310; 30; 20 MG/100ML; MG/100ML; MG/100ML; MG/100ML
INJECTION, SOLUTION INTRAVENOUS CONTINUOUS PRN
Status: DISCONTINUED | OUTPATIENT
Start: 2019-02-14 | End: 2019-02-14 | Stop reason: SURG

## 2019-02-14 RX ORDER — LABETALOL HYDROCHLORIDE 5 MG/ML
INJECTION, SOLUTION INTRAVENOUS AS NEEDED
Status: DISCONTINUED | OUTPATIENT
Start: 2019-02-14 | End: 2019-02-14 | Stop reason: SURG

## 2019-02-14 RX ORDER — AMLODIPINE BESYLATE 5 MG/1
5 TABLET ORAL 2 TIMES DAILY
Status: DISCONTINUED | OUTPATIENT
Start: 2019-02-14 | End: 2019-02-15 | Stop reason: HOSPADM

## 2019-02-14 RX ORDER — ONDANSETRON 2 MG/ML
4 INJECTION INTRAMUSCULAR; INTRAVENOUS ONCE AS NEEDED
Status: DISCONTINUED | OUTPATIENT
Start: 2019-02-14 | End: 2019-02-14 | Stop reason: HOSPADM

## 2019-02-14 RX ORDER — SODIUM CHLORIDE 9 MG/ML
INJECTION, SOLUTION INTRAVENOUS CONTINUOUS PRN
Status: DISCONTINUED | OUTPATIENT
Start: 2019-02-14 | End: 2019-02-14 | Stop reason: SURG

## 2019-02-14 RX ADMIN — OXYCODONE HYDROCHLORIDE AND ACETAMINOPHEN 1 TABLET: 5; 325 TABLET ORAL at 22:01

## 2019-02-14 RX ADMIN — DEXAMETHASONE SODIUM PHOSPHATE 5 MG: 10 INJECTION, SOLUTION INTRAMUSCULAR; INTRAVENOUS at 11:17

## 2019-02-14 RX ADMIN — GLYCOPYRROLATE 0.8 MG: 0.2 INJECTION, SOLUTION INTRAMUSCULAR; INTRAVENOUS at 12:11

## 2019-02-14 RX ADMIN — NEOSTIGMINE METHYLSULFATE 4 MG: 1 INJECTION INTRAVENOUS at 12:11

## 2019-02-14 RX ADMIN — ACETYLCYSTEINE 1200 MG: 200 SOLUTION ORAL; RESPIRATORY (INHALATION) at 18:28

## 2019-02-14 RX ADMIN — AMLODIPINE BESYLATE 5 MG: 5 TABLET ORAL at 18:28

## 2019-02-14 RX ADMIN — FENTANYL CITRATE 50 MCG: 50 INJECTION, SOLUTION INTRAMUSCULAR; INTRAVENOUS at 11:29

## 2019-02-14 RX ADMIN — PROPOFOL 30 MG: 10 INJECTION, EMULSION INTRAVENOUS at 10:59

## 2019-02-14 RX ADMIN — LIDOCAINE HYDROCHLORIDE 80 MG: 20 INJECTION, SOLUTION INTRAVENOUS at 10:56

## 2019-02-14 RX ADMIN — LABETALOL HYDROCHLORIDE 150 MG: 100 TABLET, FILM COATED ORAL at 08:43

## 2019-02-14 RX ADMIN — AMLODIPINE BESYLATE 5 MG: 5 TABLET ORAL at 08:45

## 2019-02-14 RX ADMIN — SERTRALINE HYDROCHLORIDE 50 MG: 50 TABLET ORAL at 08:44

## 2019-02-14 RX ADMIN — ROCURONIUM BROMIDE 30 MG: 10 INJECTION INTRAVENOUS at 11:29

## 2019-02-14 RX ADMIN — PROPOFOL 150 MG: 10 INJECTION, EMULSION INTRAVENOUS at 10:57

## 2019-02-14 RX ADMIN — OXYCODONE HYDROCHLORIDE AND ACETAMINOPHEN 1 TABLET: 5; 325 TABLET ORAL at 16:53

## 2019-02-14 RX ADMIN — ROCURONIUM BROMIDE 30 MG: 10 INJECTION INTRAVENOUS at 10:58

## 2019-02-14 RX ADMIN — SODIUM CHLORIDE, SODIUM LACTATE, POTASSIUM CHLORIDE, AND CALCIUM CHLORIDE: .6; .31; .03; .02 INJECTION, SOLUTION INTRAVENOUS at 11:05

## 2019-02-14 RX ADMIN — ASPIRIN 81 MG: 81 TABLET, COATED ORAL at 08:44

## 2019-02-14 RX ADMIN — LABETALOL HYDROCHLORIDE 150 MG: 100 TABLET, FILM COATED ORAL at 21:59

## 2019-02-14 RX ADMIN — FENTANYL CITRATE 50 MCG: 50 INJECTION, SOLUTION INTRAMUSCULAR; INTRAVENOUS at 10:56

## 2019-02-14 RX ADMIN — ACETYLCYSTEINE 1200 MG: 200 SOLUTION ORAL; RESPIRATORY (INHALATION) at 08:43

## 2019-02-14 RX ADMIN — LABETALOL HYDROCHLORIDE 5 MG: 5 INJECTION, SOLUTION INTRAVENOUS at 12:23

## 2019-02-14 RX ADMIN — LABETALOL HYDROCHLORIDE 150 MG: 100 TABLET, FILM COATED ORAL at 16:52

## 2019-02-14 RX ADMIN — ONDANSETRON 4 MG: 2 INJECTION INTRAMUSCULAR; INTRAVENOUS at 11:18

## 2019-02-14 RX ADMIN — HEPARIN SODIUM 5000 UNITS: 5000 INJECTION INTRAVENOUS; SUBCUTANEOUS at 22:02

## 2019-02-14 RX ADMIN — HEPARIN SODIUM 5000 UNITS: 5000 INJECTION INTRAVENOUS; SUBCUTANEOUS at 05:53

## 2019-02-14 RX ADMIN — DIPHENHYDRAMINE HCL 50 MG: 25 TABLET ORAL at 08:44

## 2019-02-14 RX ADMIN — SODIUM CHLORIDE 100 ML/HR: 0.9 INJECTION, SOLUTION INTRAVENOUS at 13:15

## 2019-02-14 RX ADMIN — HEPARIN SODIUM 6000 UNITS: 1000 INJECTION INTRAVENOUS; SUBCUTANEOUS at 11:47

## 2019-02-14 RX ADMIN — LABETALOL HYDROCHLORIDE 5 MG: 5 INJECTION, SOLUTION INTRAVENOUS at 12:33

## 2019-02-14 RX ADMIN — SODIUM CHLORIDE: 9 INJECTION, SOLUTION INTRAVENOUS at 10:47

## 2019-02-14 RX ADMIN — METHYLPREDNISOLONE 32 MG: 16 TABLET ORAL at 07:46

## 2019-02-14 RX ADMIN — OXYCODONE HYDROCHLORIDE AND ACETAMINOPHEN 1 TABLET: 5; 325 TABLET ORAL at 03:54

## 2019-02-14 RX ADMIN — PROTAMINE SULFATE 20 MG: 10 INJECTION, SOLUTION INTRAVENOUS at 12:13

## 2019-02-14 NOTE — ANESTHESIA POSTPROCEDURE EVALUATION
Post-Op Assessment Note    CV Status:  Stable    Pain management: adequate     Mental Status:  Lethargic   Hydration Status:  Euvolemic   PONV Controlled:  Controlled   Airway Patency:  Patent   Post Op Vitals Reviewed: Yes      Staff: CRNA           BP   148/72   Temp   98 2   Pulse  76   Resp   14   SpO2   95

## 2019-02-14 NOTE — PHYSICIAN ADVISOR
Current patient class: Observation  The patient is currently on Hospital Day: 2 at [address] [place] [address]      This patient was originally admitted to the hospital under observation status  After admission the patient was reevaluated and determined to require further hospitalization  The patient is now documented to require at least a 2nd midnight in the hospital  As such the patient is now expected to satisfy the 2 midnight benchmark and is therefore eligible for inpatient admission  After review of the relevant documentation, labs, vital signs and test results, the patient is appropriate for INPATIENT ADMISSION  Admission to the hospital as an inpatient is a complex decision making process which requires the practitioner to consider the patients presenting complaint, history and physical examination and all relevant testing  With this in mind, in this case, the patient was deemed appropriate for INPATIENT ADMISSION  After review of the documentation and testing available at the time of the admission I concur with this clinical determination of medical necessity  Rationale is as follows: The patient is a 70 yrs Female who presented to the ED at 2/13/2019  8:56 PM with a chief complaint of intermittent abdominal pain  There was concern for chronic mesenteric ischemia  Given that this is recurring with eating patient was admitted for a mesenteric angiogram with possible celiac/SMA intervention  The patient has a history of stage 3/4 chronic kidney disease and nephrology evaluation was requested  Patient was placed on IV fluids Mucomyst   Patient also has a contrast allergy and was prepped with steroids prior to the procedure    Given that the patient needs a contrast study with a history of chronic kidney disease stage 3 to 4 needing IV fluids and Mucomyst to prevent further kidney injury and complications such as acute tubular necrosis/dialysis the patient is going stay at least 1 more day and therefore satisfies the 2 midnight benchmark as set by Medicare  She is inpatient admission appropriate  The patients vitals on arrival were ED Triage Vitals   Temperature Pulse Respirations Blood Pressure SpO2   02/13/19 2300 02/13/19 2300 02/13/19 2300 02/13/19 2300 02/13/19 2300   97 6 °F (36 4 °C) 67 18 136/64 94 %      Temp Source Heart Rate Source Patient Position - Orthostatic VS BP Location FiO2 (%)   02/13/19 2300 02/14/19 0733 02/13/19 2300 02/13/19 2300 --   Oral Monitor Lying Right arm       Pain Score       02/14/19 0354       7           Past Medical History:   Diagnosis Date    AAA (abdominal aortic aneurysm) (HCC)     s/p EVAR    Anxiety     Aortic aneurysm (HCC)     mid descending aorta    Asthma     Carotid stenosis, bilateral     Chronic back pain     CKD (chronic kidney disease) stage 4, GFR 15-29 ml/min (HCC)     COPD (chronic obstructive pulmonary disease) (HCC)     Depression     Emphysema of lung (HCC)     chronic home O2    Fatty liver     Fibromyalgia     Gastroparesis     HLD (hyperlipidemia)     Hyperparathyroidism ([address] )     Hypertension     Mesenteric artery stenosis (HCC)     Nephrolithiasis     Non Hodgkin's lymphoma ([address] )     s/p Chemo    On home O2     Osteoarthritis     PAD (peripheral artery disease) (HCC)     PUD (peptic ulcer disease)     Shortness of breath     Thoracoabdominal aortic [facility]TAAA) ([facility])      Past Surgical History:   Procedure Laterality Date    ABDOMINAL AORTIC ANEURYSM REPAIR      [place]    CARDIAC CATHETERIZATION      CATARACT EXTRACTION BILATERAL W/ ANTERIOR VITRECTOMY      COLONOSCOPY N/A 5/20/2016    Procedure: COLONOSCOPY;  Surgeon: Nat Vargas MD;  [facility]: [facility]; Service:     ESOPHAGOGASTRODUODENOSCOPY N/A 5/20/2016    Procedure: ESOPHAGOGASTRODUODENOSCOPY (EGD); Surgeon: Nat Vargas MD;  [facility];   Service:     ESOPHAGOGASTRODUODENOSCOPY N/A 4/7/2016 Procedure: ESOPHAGOGASTRODUODENOSCOPY (EGD); Surgeon: Mary Grace Keene MD;  [facility]: [facility];   Service:     EXPLORATORY LAPAROTOMY      removal lymph nodes    HYSTERECTOMY      NECK DISSECTION Left     RADIOFREQUENCY ABLATION NERVES      lower back       The patient was admitted to the hospital at 2056 on 2/13/19 for the following diagnosis:  Superior mesenteric artery stenosis ([address] ) [I77 1]    Consults have been placed to:   IP CONSULT TO NEPHROLOGY    Vitals:    02/13/19 2300 02/14/19 0733   BP: 136/64 168/78   BP Location: Right arm Left arm   Pulse: 67 87   Resp: 18 18   Temp: 97 6 °F (36 4 °C)    TempSrc: Oral    SpO2: 94% 96%       Most recent labs:    Recent Labs     02/14/19  0625   WBC 3 74*   HGB 10 4*   HCT 33 3*   *   K 4 6   CALCIUM 9 2   BUN 35*   CREATININE 1 94*   INR 0 98       Scheduled Meds:  Current Facility-Administered Medications:  [MAR Hold] acetylcysteine 1,200 mg Oral BID Reino Merlin, MD    amLODIPine 5 mg Oral BID Reino Merlin, MD    aspirin 81 mg Oral Daily David Singer MD    [facility] Hold] calcium carbonate 500 mg Oral BID PRN David Singer MD    [facility] Hold] famotidine 20 mg Oral Daily PRN David Singer MD    [facility] Hold] heparin (porcine) 5,000 Units Subcutaneous [facility] David Singer MD    labetalol 150 mg Oral TID Reino Merlin, MD    ondansetron 4 mg Oral Q4H PRN David Singer MD    oxyCODONE-acetaminophen 1 tablet Oral Q4H PRN David Singer MD    sertraline 50 mg Oral Daily David Singer MD    sodium chloride 100 mL/hr Intravenous Continuous Reino Merlin, MD Last Rate: 100 mL/hr (02/14/19 0830)     Facility-Administered Medications Ordered in Other Encounters:  fentanyl citrate (PF)  Intravenous PRN Armond Dan CRNA   lactated ringers  Intravenous Continuous PRN Armond Dan CRNA   lidocaine (cardiac)  Intravenous PRN Benjaminine MILADY Dan   propofol  Intravenous PRN Armond Dan CRNA   rocuronium  Intravenous PRN Adalmonica Dan CRNA     Continuous Infusions:  sodium chloride 100 mL/hr Last Rate: 100 mL/hr (02/14/19 0830)     PRN Meds:  [MAR Hold] calcium carbonate    [MAR Hold] famotidine    ondansetron    oxyCODONE-acetaminophen    Surgical procedures (if appropriate):  Procedure(s):  ARTERIOGRAM

## 2019-02-14 NOTE — H&P
H&P Exam - General Surgery   Mari Moreno 70 y o  female MRN: [id number]  Unit/Bed#: -01 Encounter: [id number]    Assessment/Plan     Assessment:  78F here for mesenteric Agram, recommended to have pre-hydration and allergy treatments prior to procedure and is admitted for these pre-treatments  Plan:  -medrol 32mg 12 hours and 32mg 2 hours prior to procedure  -benadryl 50mg 1 hour prior to procedure  -Pudens@WhenSoon 12 hours pre- and post-procedure  -restart home meds  -check AM labs, including type and screen  -NPO past midnight    History of Present Illness   HPI:  Mari Moreno is a 70 y o  female who presents with mesenteric ischemia and SMA stenosis  She has CKD and a contrast allergy, so she requires 12 hours of hydration, pre-treatment, and post procedure hydration  She was a direct admission from Dr Shahab Pastor to accomplish these tasks  She has no current symptoms, no complaints  She feels well  Review of Systems   Constitutional: Negative  Negative for activity change and appetite change  HENT: Negative  Negative for hearing loss and trouble swallowing  Eyes: Negative  Negative for photophobia and redness  Respiratory: Negative  Negative for chest tightness and shortness of breath  Cardiovascular: Negative  Negative for chest pain and palpitations  Gastrointestinal: Negative  Negative for abdominal distention and abdominal pain  Endocrine: Negative  Negative for cold intolerance and heat intolerance  Genitourinary: Negative  Negative for flank pain and genital sores  Musculoskeletal: Negative  Negative for arthralgias and back pain  Skin: Negative  Negative for color change and pallor  Allergic/Immunologic: Negative  Neurological: Negative  Negative for facial asymmetry, speech difficulty and light-headedness  Hematological: Negative  Negative for adenopathy  Psychiatric/Behavioral: Negative  Negative for agitation and behavioral problems         Historical Information   Past Medical History:   Diagnosis Date    AAA (abdominal aortic aneurysm) (HCC)     s/p EVAR    Anxiety     Aortic aneurysm (HCC)     mid descending aorta    Asthma     Carotid stenosis, bilateral     Chronic back pain     CKD (chronic kidney disease) stage 4, GFR 15-29 ml/min (HCC)     COPD (chronic obstructive pulmonary disease) (HCC)     Depression     Emphysema of lung (HCC)     chronic home O2    Fatty liver     Fibromyalgia     Gastroparesis     HLD (hyperlipidemia)     Hyperparathyroidism ([address] )     Hypertension     Mesenteric artery stenosis (HCC)     Nephrolithiasis     Non Hodgkin's lymphoma (HCC)     s/p Chemo    On home O2     Osteoarthritis     PAD (peripheral artery disease) (HCC)     PUD (peptic ulcer disease)     Shortness of breath     Thoracoabdominal aortic aneurysm (TAAA) (HCC)      Past Surgical History:   Procedure Laterality Date    ABDOMINAL AORTIC [place]M [place]PAIR      [place]    CARDIAC CATHETERIZATION      CATARACT EXTRACTION BILATERAL W/ ANTERIOR VITRECTOMY      COLONOSCOPY N/A 5/20/2016    Procedure: COLONOSCOPY;  Surgeon: Jenifer Jacques MD;  [facility]; Service:     ESOPHAGOGASTRODUODENOSCOPY N/A 5/20/2016    Procedure: ESOPHAGOGASTRODUODENOSCOPY (EGD); Surgeon: Jenifer Jacques MD;  [facility]; Service:     ESOPHAGOGASTRODUODENOSCOPY N/A 4/7/2016    Procedure: ESOPHAGOGASTRODUODENOSCOPY (EGD); Surgeon: Jenifer Jacques MD;  [facility];   Service:     EXPLORATORY LAPAROTOMY      removal lymph nodes    HYSTERECTOMY      NECK DISSECTION Left     RADIOFREQUENCY ABLATION NERVES      lower back     Social History   Social History     Substance and Sexual Activity   Alcohol Use No     Social History     Substance and Sexual Activity   Drug Use No     Social History     Tobacco Use   Smoking Status Former Smoker   Smokeless Tobacco Never Used   Tobacco Comment    pt states she quit 2012     Family History: No family history on file  Meds/Allergies   PTA meds:   Prior to Admission Medications   Prescriptions Last Dose Informant Patient Reported? Taking? Biotin w/ Vitamins C & E (HAIR SKIN & NAILS GUMMIES PO)   Yes No   Sig: Take 1 capsule by mouth daily   Cholecalciferol (VITAMIN D-3 PO)   Yes No   Sig: Take 1,000 mcg by mouth daily    L-Lysine 1000 MG TABS   Yes No   Sig: Take 1,000 mg by mouth daily as needed    OXYGEN-HELIUM IN   Yes No   Sig: Inhale 4 L/min continuous    acyclovir (ZOVIRAX) 5 % ointment   Yes No   Sig: Apply 1 application topically as needed    amLODIPine (NORVASC) 5 mg tablet   Yes No   Sig: Take 5 mg by mouth 2 (two) times a day   aspirin (ECOTRIN LOW STRENGTH) 81 mg EC tablet   Yes No   Sig: Take 81 mg by mouth daily   calcitriol (ROCALTROL) 0 25 mcg capsule   Yes No   Sig: Take 0 25 mcg by mouth 3 (three) times a week MON , WED, FRI   diphenhydrAMINE (BENADRYL) 50 MG tablet   No No   Sig: Take 1 tablet (50 mg total) by mouth daily at bedtime as needed for itching Take 1 tablet one hour before study   famotidine-calcium carbonate-magnesium hydroxide (PEPCID COMPLETE) -165 MG CHEW   Yes No   Sig: Chew 1 tablet daily as needed for heartburn   labetalol (NORMODYNE) 300 mg tablet   Yes No   Sig: Take 150 mg by mouth 3 (three) times a day   methylPREDNISolone (MEDROL) 32 MG tablet   No No   Sig: Take 1 tablet (32 mg total) by mouth 2 (two) times a day for 2 days Take 32 mgmg 12 hours before study and 32 mgmg 2  Hours before study   ondansetron (ZOFRAN) 4 mg tablet   Yes No   Sig: Take 4 mg by mouth every 6 (six) hours as needed for nausea or vomiting    oxyCODONE-acetaminophen (PERCOCET) 5-325 mg per tablet   Yes No   Sig: Take 1 tablet by mouth every 4 (four) hours as needed for moderate pain  sertraline (ZOLOFT) 50 mg tablet   Yes No   Sig: Take 50 mg by mouth daily        Facility-Administered Medications: None     Allergies   Allergen Reactions    Iodinated Diagnostic Agents Anaphylaxis  Iodine Anaphylaxis     aslo ivp dye  shock    Ciprofloxacin      Achilles tendonitis  Quinolones also    Eggs Or Egg-Derived Products      Stomach pain    Fish-Derived Products      Stomach pain WITH JUST CRAB    Influenza Vaccines Abdominal Pain     DUE TO EGG ALLERGY    Latex Rash     Rash itching SKIN TEARS    Medical Tape Itching    Nexium [Esomeprazole] Diarrhea     Headache,diarrhea,stomach pain    Other      Adhesive tears skin    Rituxan [Rituximab]     Sulfa Antibiotics      Severe headache       Objective   First Vitals:        Current Vitals:        No intake or output data in the 24 hours ending 02/13/19 2214    Invasive Devices          None          Physical Exam   Constitutional: She appears well-developed and well-nourished  No distress  HENT:   Head: Normocephalic and atraumatic  Right Ear: External ear normal    Left Ear: External ear normal    Eyes: Pupils are equal, round, and reactive to light  EOM are normal  Right eye exhibits no discharge  Left eye exhibits no discharge  No scleral icterus  Neck: No tracheal deviation present  Cardiovascular: Normal rate  Pulses:       Carotid pulses are 2+ on the right side, and 2+ on the left side  Radial pulses are 2+ on the right side, and 2+ on the left side  Femoral pulses are 2+ on the right side, and 2+ on the left side  Dorsalis pedis pulses are 2+ on the right side, and 2+ on the left side  Pulmonary/Chest: Effort normal  No respiratory distress  Abdominal: Soft  She exhibits no distension  There is no tenderness  Neurological: She is alert  Coordination normal    Skin: No rash noted  She is not diaphoretic  Vitals reviewed        Lab Results: CBC: No results found for: WBC, HGB, HCT, MCV, PLT, ADJUSTEDWBC, MCH, MCHC, RDW, MPV, NRBC, CMP: No results found for: SODIUM, K, CL, CO2, ANIONGAP, BUN, CREATININE, GLUCOSE, CALCIUM, AST, ALT, ALKPHOS, PROT, BILITOT, EGFR  Imaging: I have personally reviewed pertinent reports  EKG, Pathology, and Other Studies: I have personally reviewed pertinent reports        Code Status: Level 1 - Full Code  Advance Directive and Living Will: Yes    Power of :    POLST:

## 2019-02-14 NOTE — PROGRESS NOTES
Vascular Surgery    Progress Note - Clarita Estrada 1940, 70 y o  female MRN: [id number]    Unit/Bed#: MS Sharif Encounter: [id number]    Primary Care Provider: Rosa M Arriaza DO   Date and time admitted to hospital: 2/13/2019  8:56 PM    * Chronic mesenteric ischemia [facility])  Assessment & Plan  Chronic Mesenteric Ischemia in setting of known SMA stenosis    Plan:  --Mesenteric arteriogram, possible brachial access, possible celiac/SMA intervention  --Renal preparation in progress  --NPO  --IVF  --F/U Am labs    CKD (chronic kidney disease) stage 4, GFR 15-29 ml/min ([facility])  Assessment & Plan  Plan:  --Renal preparation and monitoring pre/post arteriogram  --Nephrology consult pending  --BMP pending      Subjective:  No events overnight    Vitals:  /64 (BP Location: Right arm)   Pulse 67   Temp 97 6 °F (36 4 °C) (Oral)   Resp 18   SpO2 94%     I/Os:  I/O last 3 completed shifts: In: 500 [I V :500]  Out: 450 [Urine:450]  No intake/output data recorded      Lab Results and Cultures:   Lab Results   Component Value Date    WBC 3 74 (L) 02/14/2019    HGB 10 4 (L) 02/14/2019    HCT 33 3 (L) 02/14/2019    MCV 89 02/14/2019     (L) 02/14/2019     Lab Results   Component Value Date    CALCIUM 8 5 12/09/2016    K 4 4 12/09/2016    CO2 26 12/09/2016     12/09/2016    BUN 44 (H) 12/09/2016    CREATININE 1 89 (H) 12/09/2016     Lab Results   Component Value Date    INR 0 98 02/14/2019    PROTIME 13 1 02/14/2019        Blood Culture: No results found for: BLOODCX,   Urinalysis: No results found for: Anel Porteous, SPECGRAV, PHUR, LEUKOCYTESUR, NITRITE, PROTEINUA, GLUCOSEU, KETONESU, BILIRUBINUR, BLOODU,   Urine Culture: No results found for: URINECX,   Wound Culure: No results found for: WOUNDCULT    Medications:  Current Facility-Administered Medications   Medication Dose Route Frequency    amLODIPine (NORVASC) tablet 5 mg  5 mg Oral BID    aspirin (ECOTRIN LOW STRENGTH) EC tablet 81 mg  81 mg Oral Daily    calcium carbonate (TUMS) chewable tablet 500 mg  500 mg Oral BID PRN    diphenhydrAMINE (BENADRYL) tablet 50 mg  50 mg Oral Once    famotidine (PEPCID) tablet 20 mg  20 mg Oral Daily PRN    heparin (porcine) subcutaneous injection 5,000 Units  5,000 Units Subcutaneous Q8H [address]    labetalol (NORMODYNE) tablet 150 mg  150 mg Oral TID    methylPREDNISolone (MEDROL) tablet 32 mg  32 mg Oral Once    ondansetron (ZOFRAN-ODT) dispersible tablet 4 mg  4 mg Oral Q4H PRN    oxyCODONE-acetaminophen (PERCOCET) 5-325 mg per tablet 1 tablet  1 tablet Oral Q4H PRN    sertraline (ZOLOFT) tablet 50 mg  50 mg Oral Daily    sodium chloride 0 9 % infusion  75 mL/hr Intravenous Continuous       Physical Exam:    General appearance: alert and oriented, in no acute distress  Lungs: clear to auscultation bilaterally  Heart: regular rate and rhythm, S1, S2 normal, no murmur, click, rub or gallop  Abdomen: soft, non-tender; bowel sounds normal; no masses,  no organomegaly  Extremities: extremities normal, warm and well-perfused; no cyanosis, clubbing, or edema      Nesha Krueger PA-C  2/14/2019

## 2019-02-14 NOTE — ASSESSMENT & PLAN NOTE
Plan:  --Renal preparation and monitoring pre/post arteriogram  --Nephrology consult pending  --BMP pending

## 2019-02-14 NOTE — CONSULTS
Consultation - Nephrology   Kush Staton 70 y o  female MRN: [id number]  Unit/Bed#: OR POOL Encounter: [id number]      ASSESSMENT / PLAN:     1  CKD stage 3/4  · Likely secondary to hypertension  Additionally, she stated that she had a significant episode of CASSANDRA after her EVAR in 2012  Renal artery stenosis is a consideration as well  · Baseline creatinine is mostly 1 6-2  The patient is currently at her baseline creatinine  · Her risk of CASSANDRA after dye exposure is increased by virtue of her CKD history and prior dye induced CASSANDRA  · Will prep with intravenous fluids and Mucomyst   Her last ejection fraction was 60% and she exhibits no signs of volume overload at this time  · Recheck BMP in a m  2  Hypertension  · Avoid relative hypotension with upcoming dye exposure  · Will adjust hold parameters on antihypertensives  3  Celiac/SMA stenosis  4  Anemia - normal MCV and RDW  · Has history of lymphoma and follows with Hematology                  History of Present Illness   Physician Requesting Consult: Sherren Reams, MD  Reason for Consult / Principal Problem -  the patient has a history of CKD and is undergoing an angiogram   HPI- Kush Staton is a 70 y o  y o  female who we are asked to see because of CKD and need for angiogram   The patient has a significant vascular history including a prior EVAR in 2012 in [place], thoracic aortic aneurysm, bilateral asymptomatic carotid stenosis, celiac and SMA stenosis by duplex  She has a history of intermittent postprandial abdominal pain  The symptoms have fluctuated over time  Of late, she had been complaining of increased abdominal pain after eating  Therefore, it was felt that she needs a mesenteric angiogram to assess the vasculature  She does have a history of dye induced CASSANDRA, per her report, in 2012 when she received dye for her EVAR  The patient was seen and examined prior to her angiogram this morning at 8:30 a m  [facility]   She denied any shortness of breath, chest pain or pressure, abdominal pain, vomiting, diarrhea  She follows with Dr Louise Mondragon for CKD  She has CKD stage 3/4 per her outpatient testing  Her creatinine has been running between 1 6-2 dating back to 2016  Most recently, the creatinine was 1 82 in October of 2018  At times, her creatinine has been in the low 2s  History obtained from chart review and the patient    Consults    Review of Systems   Constitutional: Negative for appetite change, chills, fatigue and fever  HENT: Negative for sinus pressure  Eyes: Negative for redness and visual disturbance  Respiratory: Negative for cough, shortness of breath and wheezing  She does wear chronic oxygen since she had her EVAR, per her report   Cardiovascular: Positive for leg swelling (She does have occasional ankle swelling per her report)  Negative for chest pain and palpitations  Gastrointestinal: Positive for abdominal pain ( after ingesting food)  Negative for constipation, diarrhea, nausea and vomiting  Endocrine: Negative for polyuria  Genitourinary: Negative for decreased urine volume, difficulty urinating, dysuria, flank pain, frequency, hematuria and urgency  Musculoskeletal: Negative for arthralgias, back pain and joint swelling  Skin: Negative for rash  Neurological: Negative for dizziness, syncope and headaches  Hematological: Does not bruise/bleed easily  Psychiatric/Behavioral: Negative for confusion and sleep disturbance         Historical Information   Patient Active Problem List   Diagnosis    Carotid stenosis, asymptomatic, bilateral    CKD (chronic kidney disease) stage 4, GFR 15-29 ml/min ([facility])    AAA (abdominal aortic aneurysm) ([facility])    Follicular lymphoma ([address] )    Hypertension    Pulmonary emphysema ([address] )    Superior mesenteric artery stenosis ([address] )    Thoracic aortic aneurysm without rupture ([address] )    Chronic mesenteric ischemia ([address] )     Past Medical History:   Diagnosis Date    AAA (abdominal aortic aneurysm) (HCC)     s/p EVAR    Anxiety     Aortic aneurysm (HCC)     mid descending aorta    Asthma     Carotid stenosis, bilateral     Chronic back pain     CKD (chronic kidney disease) stage 4, GFR 15-29 ml/min (HCC)     COPD (chronic obstructive pulmonary disease) (HCC)     Depression     Emphysema of lung (HCC)     chronic home O2    Fatty liver     Fibromyalgia     Gastroparesis     HLD (hyperlipidemia)     Hyperparathyroidism ([address] )     Hypertension     Mesenteric artery stenosis (HCC)     Nephrolithiasis     Non Hodgkin's lymphoma (HCC)     s/p Chemo    On home O2     Osteoarthritis     PAD (peripheral artery disease) (HCC)     PUD (peptic ulcer disease)     Shortness of breath     Thoracoabdominal aortic aneurysm (TAAA) (HCC)      Past Surgical History:   Procedure Laterality Date    ABDOMINAL [place]    CARDIAC CATHETERIZATION      CATARACT EXTRACTION BILATERAL W/ ANTERIOR VITRECTOMY      COLONOSCOPY N/A 5/20/2016    Procedure: COLONOSCOPY;  Surgeon: Long Marroquin MD;  [facility]; Service:     ESOPHAGOGASTRODUODENOSCOPY N/A 5/20/2016    Procedure: ESOPHAGOGASTRODUODENOSCOPY (EGD); Surgeon: Long Marroquin MD;  [facility]; Service:     ESOPHAGOGASTRODUODENOSCOPY N/A 4/7/2016    Procedure: ESOPHAGOGASTRODUODENOSCOPY (EGD); Surgeon: Long Marroquin MD;  [facility]; Service:     EXPLORATORY LAPAROTOMY      removal lymph nodes    HYSTERECTOMY      NECK DISSECTION Left     RADIOFREQUENCY ABLATION NERVES      lower back     Social History   Social History     Substance and Sexual Activity   Alcohol Use No     Social History     Substance and Sexual Activity   Drug Use No     Social History     Tobacco Use   Smoking Status Former Smoker   Smokeless Tobacco Former User    Quit date: 2/13/2012   Tobacco Comment    pt states she quit 2012     History reviewed   No pertinent family history  Meds/Allergies   all current active meds have been reviewed, current meds:   Current Facility-Administered Medications   Medication Dose Route Frequency    [MAR Hold] acetylcysteine (MUCOMYST) 200 mg/mL oral solution 1,200 mg  1,200 mg Oral BID    amLODIPine (NORVASC) tablet 5 mg  5 mg Oral BID    aspirin (ECOTRIN LOW STRENGTH) EC tablet 81 mg  81 mg Oral Daily    [MAR Hold] calcium carbonate (TUMS) chewable tablet 500 mg  500 mg Oral BID PRN    [MAR Hold] famotidine (PEPCID) tablet 20 mg  20 mg Oral Daily PRN    [MAR Hold] heparin (porcine) subcutaneous injection 5,000 Units  5,000 Units Subcutaneous Q8H [address]    labetalol (NORMODYNE) tablet 150 mg  150 mg Oral TID    ondansetron (ZOFRAN-ODT) dispersible tablet 4 mg  4 mg Oral Q4H PRN    oxyCODONE-acetaminophen (PERCOCET) 5-325 mg per tablet 1 tablet  1 tablet Oral Q4H PRN    sertraline (ZOLOFT) tablet 50 mg  50 mg Oral Daily    sodium chloride 0 9 % infusion  100 mL/hr Intravenous Continuous    and PTA meds:   Prior to Admission Medications   Prescriptions Last Dose Informant Patient Reported? Taking?    Biotin w/ Vitamins C & E (HAIR SKIN & NAILS GUMMIES PO)   Yes No   Sig: Take 1 capsule by mouth daily   Cholecalciferol (VITAMIN D-3 PO)   Yes No   Sig: Take 1,000 mcg by mouth daily    L-Lysine 1000 MG TABS   Yes No   Sig: Take 1,000 mg by mouth daily as needed    OXYGEN-HELIUM IN   Yes No   Sig: Inhale 4 L/min continuous    acyclovir (ZOVIRAX) 5 % ointment   Yes No   Sig: Apply 1 application topically as needed    amLODIPine (NORVASC) 5 mg tablet   Yes No   Sig: Take 5 mg by mouth 2 (two) times a day   aspirin (ECOTRIN LOW STRENGTH) 81 mg EC tablet   Yes No   Sig: Take 81 mg by mouth daily   calcitriol (ROCALTROL) 0 25 mcg capsule   Yes No   Sig: Take 0 25 mcg by mouth 3 (three) times a week MON , WED, FRI   diphenhydrAMINE (BENADRYL) 50 MG tablet   No No   Sig: Take 1 tablet (50 mg total) by mouth daily at bedtime as needed for itching Take 1 tablet one hour before study   famotidine-calcium carbonate-magnesium hydroxide (PEPCID COMPLETE) -165 MG CHEW   Yes No   Sig: Chew 1 tablet daily as needed for heartburn   labetalol (NORMODYNE) 300 mg tablet   Yes No   Sig: Take 150 mg by mouth 3 (three) times a day   methylPREDNISolone (MEDROL) 32 MG tablet   No No   Sig: Take 1 tablet (32 mg total) by mouth 2 (two) times a day for 2 days Take 32 mgmg 12 hours before study and 32 mgmg 2  Hours before study   ondansetron (ZOFRAN) 4 mg tablet   Yes No   Sig: Take 4 mg by mouth every 6 (six) hours as needed for nausea or vomiting    oxyCODONE-acetaminophen (PERCOCET) 5-325 mg per tablet   Yes No   Sig: Take 1 tablet by mouth every 4 (four) hours as needed for moderate pain  sertraline (ZOLOFT) 50 mg tablet   Yes No   Sig: Take 50 mg by mouth daily  Facility-Administered Medications: None       Scheduled Meds:  Current Facility-Administered Medications:  [MAR Hold] acetylcysteine 1,200 mg Oral BID Reino Merlin, MD    amLODIPine 5 mg Oral BID David Singer MD    aspirin 81 mg Oral Daily David Singre MD    [facility] Hold] calcium carbonate 500 mg Oral BID PRN David Singer MD    [facility] Hold] famotidine 20 mg Oral Daily PRN David Singer MD    [facility] Hold] heparin (porcine) 5,000 Units Subcutaneous Counts include [facility] beds at the [facility] David Singer MD    labetalol 150 mg Oral TID David Singer MD    ondansetron 4 mg Oral Q4H PRN David Singer MD    oxyCODONE-acetaminophen 1 tablet Oral Q4H PRN David Singer MD    sertraline 50 mg Oral Daily David Singer MD    sodium chloride 100 mL/hr Intravenous Continuous Reino Merlin, MD Last Rate: 100 mL/hr (02/14/19 0830)       PRN Meds:  [MAR Hold] calcium carbonate    [MAR Hold] famotidine    ondansetron    oxyCODONE-acetaminophen    Continuous Infusions:  sodium chloride 100 mL/hr Last Rate: 100 mL/hr (02/14/19 0830)       Allergies   Allergen Reactions    Iodinated Diagnostic Agents Anaphylaxis    Iodine Anaphylaxis     aslo ivp dye  shock    Ciprofloxacin      Achilles tendonitis  Quinolones also    Eggs Or Egg-Derived Products      Stomach pain    Fish-Derived Products      Stomach pain WITH JUST CRAB    Influenza Vaccines Abdominal Pain     DUE TO EGG ALLERGY    Latex Rash     Rash itching SKIN TEARS    Medical Tape Itching    Nexium [Esomeprazole] Diarrhea     Headache,diarrhea,stomach pain    Other      Adhesive tears skin    Rituxan [Rituximab]     Sulfa Antibiotics      Severe headache             /78 (BP Location: Left arm)   Pulse 87   Temp 97 6 °F (36 4 °C) (Oral)   Resp 18   SpO2 96%   Temp (24hrs), Av 6 °F (36 4 °C), Min:97 6 °F (36 4 °C), Max:97 6 °F (36 4 °C)      Objective     Intake/Output Summary (Last 24 hours) at 2019 1024  Last data filed at 2019 0615  Gross per 24 hour   Intake 500 ml   Output 450 ml   Net 50 ml       I/O last 24 hours:   In: 500 [I V :500]  Out: 450 [Urine:450]      Current Weight:    First Weight:      PHYSICAL EXAM:     General -      the patient is awake, cooperative, pleasant and in no apparent distress  HENT  -        normocephalic/atraumatic, mucous membranes were moist  Eyes    -        extraocular movements were intact, no scleral icterus was noted  Neck    -        no jugular venous distention was noted, supple, no thyromegaly was noted, trachea midline  Chest  -         clear to auscultation and percussion, adequate inspiratory effort, no rales/rhonchi or wheezing were noted  CVS  -           S1-S2, no pericardial friction rub or S3 were appreciated  Abdomen -    soft, nontender, no rebound or guarding was noted, normoactive bowel sounds  Extremities-   no cyanosis or edema noted  Skin   -           no rash noted  Neuro   -        alert and oriented  Psych   -        mood affect were appropriate      Invasive Devices     Peripheral Intravenous Line            Peripheral IV 19 Left Antecubital less than 1 day                Lab Results:    Results from last 7 days   Lab Units 02/14/19  0625   WBC Thousand/uL 3 74*   HEMOGLOBIN g/dL 10 4*   HEMATOCRIT % 33 3*   PLATELETS Thousands/uL 120*   NEUTROS PCT % 86*   LYMPHS PCT % 10*   MONOS PCT % 2*   EOS PCT % 0   POTASSIUM mmol/L 4 6   CHLORIDE mmol/L 104   CO2 mmol/L 24   BUN mg/dL 35*   CREATININE mg/dL 1 94*   CALCIUM mg/dL 9 2       CUTURES:  No results found for: Ekta Persons      Pertinent studies were reviewed          Portions of the record may have been created with voice recognition software  Occasional wrong word or "sound a like" substitutions may have occurred due to the inherent limitations of voice recognition software  Read the chart carefully and recognize, using context, where substitutions have occurred  If you have any questions, please contact the dictating provider

## 2019-02-14 NOTE — OP NOTE
OPERATIVE REPORT  PATIENT NAME: Ibrahima Artis    :  1940  MRN: [id number]  Pt Location: [facility]BRID OR ROOM 02    SURGERY DATE: 2019    Surgeon(s) and Role:     * Nithin Esteves MD - Primary     * Matilde Chan DO - Assisting    Preop Diagnosis:  Superior mesenteric artery stenosis ([address] ) [I77 1]    Post-Op Diagnosis Codes:     * Superior mesenteric artery stenosis ([address] ) [I77 1]    Procedure(s) (LRB):  ARTERIOGRAM; left u/s guided percutaneous brachial access; sma angiography and celiac (N/A)   Ultrasound guided access left brachial artery  2nd order selective catheterization x2    Specimen(s):  * No specimens in log *    Estimated Blood Loss:   Minimal    Drains:  * No LDAs found *    Anesthesia Type:   General    Operative Indications:  76yo female with extensive PMHx including AAA s/p EVAR, thoracoabdominal aortic aneurysm involving the visceral segment, solitary kidney, with chronic abdominal pain with non-invasive studies suggestive of critical stenosis of SMA and celiac artery stenosis  Plan for mesenteric angiogram with possible intervention  Operative Findings:  No significant SMA or Celiac artery stenosis, ? Common hepatic artery stenosis versus occlusion  Complications:   None    Contrast: 24cc  Fluoro Time: 14 7min    DESCRIPTION OF PROCEDURE: The patient's consent was verified  The patient was brought to the angiosuite and conscious sedation was established  Bilateral groins and left arm were prepped and draped in the usual sterile fashion  Retrograde access of the left brachial artery was obtained utilizing ultrasound-guidance  A micropuncture kit was initially utilized to gain access which was then upsized to a short 5 [place] sheath  Utilizing an Omni flush catheter and a Glidewire Advantage wire the wire was passed into the descending thoracic aorta down into abdominal aorta    The 5 Maced[place] sheath was removed and a 690 cm sheath was placed into the proximal abdominal aorta  Patient was heparinized with 5000 units of IV heparin  Utilizing a vertebral catheter a Glidewire Advantage, the SMA was successfully selected without the use of the prior aortogram   Luminal position within the SMA was confirmed subsequent angiography which demonstrated a widely patent SMA without any hemodynamically significant stenosis  Selective SMA angiography also demonstrated some retrograde filling of the proper hepatic artery and patent visualization the celiac artery  We next selectively catheterized the celiac artery based on the prior angiogram   Selective celiac angiography demonstrated a widely patent origin of the celiac access and a patent splenic artery and left gastric  The common hepatic artery was not well visualized in either had a significant stenosis versus a complete focal occlusion  Given the widely patent and no evidence of significant stenosis in either the celiac artery or the superior mesenteric artery no intervention was  20 mg of protamine was administered  All wires and sheaths and catheters were removed and hemostasis was obtained utilizing 20 min of manual pressure over the brachial artery access site  An occlusive dressing was applied patient was transferred back to recovery area in stable condition  IMPRESSION:  No significant SMA or celiac axis stenosis  There was a significant stenosis of the common hepatic artery however the proper hepatic artery was filling in a retrograde fashion via SMA collaterals for vessel segment of the aorta was noted to be aneurysmal     I was present for the entire duration of the procedure   A qualified resident physician was not available and A co-surgeon was required because of skills and techniques relevant to speciality    Patient Disposition:  PACU     SIGNATURE: Elidia Olvera MD  DATE: February 14, 2019  TIME: 12:35 PM

## 2019-02-14 NOTE — DISCHARGE INSTRUCTIONS
DISCHARGE INSTRUCTIONS  ARTERIOGRAM/ANGIOPLASTY/STENT    Following discharge from the hospital, you may have some questions about your procedure, your activities or your general condition  These instructions may answer some of your questions and help you adjust during the first few weeks following your operation  ACTIVITY: The evening following the procedure you should be sure someone remains with you until the next morning  Rest as much as possible, sitting, lying or reclining  Use the stairs as little as possible  The following day, limit your activity to walking  Avoid stooping or heavy lifting (no more than 30 lbs) for the first three days  Walking up steps and normal activities may be resumed as you feel ready  You should not drive a car for at least two days following discharge from the hospital  You may ride in a car  If you have any questions regarding a particular activity, please discuss with your doctor or nurse before you are discharged  DIET:  Resume your normal diet  Try to eat low fat and low cholesterol foods  Drink more liquids than usual for the next 24 hours  INCISION: Your doctor may have chosen to use a type of adhesive glue, to close your incision  The glue is used to cover the incision, assist in closure, and prevent contamination  This adhesive will darken and peel away on its own within one to two weeks  You may shower after the procedure, but do not scrub the incision  Sitting in a tub is not recommended for the two days following the procedure or if you have any open wounds  It is normal to have some bruising, swelling or mild discoloration around the incision  IF increasing redness or pain develops, call our office immediately  If present, you may remove the band-aid or steri-strips over your wound after two days  If you notice any active bleeding at the site, apply pressure to the site and call our office ([phone])      FOLLOW UP STUDIES:  Your doctor will discuss whether further treatments or follow-up studies are necessary at your first post procedure visit  PLEASE CALL THE OFFICE IF YOU HAVE ANY QUESTIONS  [phone] [facility] [phone]  [address] [facility] , [address], [place], [address]  [address], [place], [address]  [address], [place], [address]  [address] [facility], [facility], [facility], [address]    [address], [address]th Floor, [address]  [address] [facility], [facility] 97   [address] [facility], [address] [facility], [address] [facility]  [address] [facility], [address] [facility], [place] [address]

## 2019-02-14 NOTE — UTILIZATION REVIEW
Initial Clinical Review    Admission: Date/Time/Statement:  Placed in observation status on 2/13 @ 22:12 changed top Inpatient admission on 2/14 @ 11:26 deu to need for > 2 MN stay for CASSANDRA monitoring on IVF's   02/14/19 1126  Inpatient Admission    Transfer Service: Vascular Surgery       Question Answer Comment   Admitting Physician [facility]    Level of Care Med Surg    Estimated length of stay More than 2 Midnights    Certification I certify that inpatient services are medically necessary for this patient for a duration of greater than two midnights  See H&P and MD Progress Notes for additional information about the patient's course of treatment  History of Austyn Mas is a 70 y o  female who presents with mesenteric ischemia and SMA stenosis plan for Mesenteric Agram  She has CKD and a contrast allergy, so she requires 12 hours of hydration, pre-treatment, and post procedure hydration  She was a direct admission from Dr Soco Carreno to accomplish these tasks  She has no current symptoms, no complaints  She feels well        Vital Signs:   Temperature Pulse Respirations Blood Pressure SpO2   02/13/19 2300 02/13/19 2300 02/13/19 2300 02/13/19 2300 02/13/19 2300   97 6 °F (36 4 °C) 67 18 136/64 94 %      Temp Source Heart Rate Source Patient Position - Orthostatic VS BP Location FiO2 (%)   02/13/19 2300 02/14/19 0733 02/13/19 2300 02/13/19 2300 --   Oral Monitor Lying Right arm       Pain Score       02/14/19 0354       7        Wt Readings from Last 1 Encounters:   08/29/18 90 3 kg (199 lb)         Pertinent Labs/Diagnostic Test Results:     Ref Range & Units 2/14/19 0625   Sodium 136 - 145 mmol/L 136    Potassium 3 5 - 5 3 mmol/L 4 6    Chloride 100 - 108 mmol/L 104    CO2 21 - 32 mmol/L 24    ANION GAP 4 - 13 mmol/L 8    BUN 5 - 25 mg/dL 35High     Creatinine 0 60 - 1 30 mg/dL 1  94High     Glucose 65 - 140 mg/dL 139    Calcium 8 3 - 10 1 mg/dL 9 2    eGFR ml/min/1 73sq m 25 Ref Range & Units 2/14/19 0625   WBC 4 31 - 10 16 Thousand/uL 3 74Low     RBC 3 81 - 5 12 Million/uL 3 75Low     Hemoglobin 11 5 - 15 4 g/dL 10 4Low     Hematocrit 34 8 - 46 1 % 33 3Low     MCV 82 - 98 fL 89    MCH 26 8 - 34 3 pg 27 7    MCHC 31 4 - 37 4 g/dL 31 2Low     RDW 11 6 - 15 1 % 13 7    MPV 8 9 - 12 7 fL 10 2    Platelets 489 - 246 Thousands/uL 120Low     nRBC /100 WBCs 0    Neutrophils Relative 43 - 75 % 86High     Immat GRANS % 0 - 2 % 1    Lymphocytes Relative 14 - 44 % 10Low     Monocytes Relative 4 - 12 % 2Low     Eosinophils Relative 0 - 6 % 0    Basophils Relative 0 - 1 % 1    Neutrophils Absolute 1 85 - 7 62 Thousands/µL 3 22    Immature Grans Absolute 0 00 - 0 20 Thousand/uL 0 02    Lymphocytes Absolute 0 60 - 4 47 Thousands/µL 0 39Low     Monocytes Absolute 0 17 - 1 22 Thousand/µL 0 08Low     Eosinophils Absolute 0 00 - 0 61 Thousand/µL 0 01    Basophils Absolute 0 00 - 0 10 Thousands/µL 0 02                         Past Medical/Surgical History:   Diagnosis    AAA (abdominal aortic aneurysm) (HCC)    Anxiety    Aortic aneurysm (HCC)    Asthma    Carotid stenosis, bilateral    Chronic back pain    CKD (chronic kidney disease) stage 4, GFR 15-29 ml/min (HCC)    COPD (chronic obstructive pulmonary disease) (HCC)    Depression    Emphysema of lung (HCC)    Fatty liver    Fibromyalgia    Gastroparesis    HLD (hyperlipidemia)    Hyperparathyroidism (HCC)    Hypertension    Mesenteric artery stenosis (HCC)    Nephrolithiasis    Non Hodgkin's lymphoma ([address] )    On home O2    Osteoarthritis    PAD (peripheral artery disease) (HCC)    PUD (peptic ulcer disease)    Shortness of breath    Thoracoabdominal aortic aneurysm (TAAA) (HCC)     Admitting Diagnosis: Superior mesenteric artery stenosis (HCC) [I77 1]  Age/Sex: 70 y o  female     Assessment/Plan: Assessment:  78F here for mesenteric Agram, recommended to have pre-hydration and allergy treatments prior to procedure and is admitted for these pre-treatments  Plan:  -medrol 32mg 12 hours and 32mg 2 hours prior to procedure  -benadryl 50mg 1 hour prior to procedure  -[email or web address] -  12 hours pre- and post-procedure  -restart home meds  -check AM labs, including type and screen  -NPO past midnight     Admission Orders:  Scheduled Meds:   Current Facility-Administered Medications:  acetylcysteine 1,200 mg Oral BID    amLODIPine 5 mg Oral BID    aspirin 81 mg Oral Daily    calcium carbonate 500 mg Oral BID PRN    famotidine 20 mg Oral Daily PRN    heparin (porcine) 5,000 Units Subcutaneous Q8H [address]    labetalol 150 mg Oral TID    ondansetron 4 mg Oral Q4H PRN    oxyCODONE-acetaminophen 1 tablet Oral Q4H PRN 2/14 x1    sertraline 50 mg Oral Daily      Continuous Infusions:   sodium chloride 100 mL/hr     Nursing  orders - VS q 4 - SCD's to le's -  Diet NPO  On 2/14

## 2019-02-14 NOTE — ANESTHESIA PREPROCEDURE EVALUATION
Review of Systems/Medical History  Patient summary reviewed  Chart reviewed  No history of anesthetic complications     Cardiovascular  EKG reviewed, Hyperlipidemia, Hypertension controlled, JOHNSON, Aortic disease (h/o AAA s/p EVAR; Descending thoracic aortic aneurysm (~4 6cm)), PVD,   Comment: Chronic mesenteric ischemia  SMA stenosis  B/l carotid artery stenosis (Right 50-69%; Left >70%)    Echo (7/25/18):  EF 32%; mild diastolic dysfunction; no significant valvular dysfunction  ,  Pulmonary  Smoker (quit 2012) ex-smoker  , COPD (w/ component of restrictive lung disease) , Oxygen dependent (on ~4 L/min NC continuously at baseline ) nasal cannula,        GI/Hepatic    PUD, Liver disease (Fatty liver disease) ,        Kidney disease CKD, Chronic kidney disease stage 4,        Endo/Other  Parathyroid disease hyperparathyroidism,   Obesity (BMI 31 46)    GYN       Hematology  Anemia ,     Musculoskeletal  Osteoarthritis,   Comment: Fibromyalgia Arthritis     Neurology  Negative neurology ROS      Psychology   Anxiety, Depression , being treated for depression,              Physical Exam    Airway    Mallampati score: II  TM Distance: >3 FB  Neck ROM: full     Dental   lower dentures and upper dentures,     Cardiovascular  Rhythm: regular, Rate: normal, Cardiovascular exam normal    Pulmonary  Pulmonary exam normal Breath sounds clear to auscultation,     Other Findings        Anesthesia Plan  ASA Score- 3     Anesthesia Type- general with ASA Monitors  Additional Monitors:   Airway Plan: ETT  Plan Factors-    Induction- intravenous  Postoperative Plan- Plan for postoperative opioid use  Planned trial extubation    Informed Consent- Anesthetic plan and risks discussed with patient  I personally reviewed this patient with the CRNA  Discussed and agreed on the Anesthesia Plan with the CRNA         NPO and allergies verified    Patient received her PO labetalol this AM   Patient received Solumedrol and Benadryl pretreatment this morning prior to arriving to pre-procedure holding area for IV contrast allergy    Plan:  GETA    Risks and benefits discussed with patient  Questions answered  Patient consented

## 2019-02-14 NOTE — PLAN OF CARE
Problem: PAIN - ADULT  Goal: Verbalizes/displays adequate comfort level or baseline comfort level  Description  Interventions:  - Encourage patient to monitor pain and request assistance  - Assess pain using appropriate pain scale  - Administer analgesics based on type and severity of pain and evaluate response  - Implement non-pharmacological measures as appropriate and evaluate response  - Consider cultural and social influences on pain and pain management  - Notify physician/advanced practitioner if interventions unsuccessful or patient reports new pain  Outcome: Progressing     Problem: SAFETY ADULT  Goal: Patient will remain free of falls  Description  INTERVENTIONS:  - Assess patient frequently for physical needs  -  Identify cognitive and physical deficits and behaviors that affect risk of falls    -  [place] fall precautions as indicated by assessment   - Educate patient/family on patient safety including physical limitations  - Instruct patient to call for assistance with activity based on assessment  - Modify environment to reduce risk of injury  - Consider OT/PT consult to assist with strengthening/mobility  Outcome: Progressing

## 2019-02-15 VITALS
DIASTOLIC BLOOD PRESSURE: 70 MMHG | TEMPERATURE: 98 F | OXYGEN SATURATION: 98 % | RESPIRATION RATE: 18 BRPM | HEART RATE: 80 BPM | SYSTOLIC BLOOD PRESSURE: 136 MMHG

## 2019-02-15 PROBLEM — J96.11 CHRONIC RESPIRATORY FAILURE WITH HYPOXIA (HCC): Status: ACTIVE | Noted: 2019-02-15

## 2019-02-15 LAB
ANION GAP SERPL CALCULATED.3IONS-SCNC: 7 MMOL/L (ref 4–13)
BUN SERPL-MCNC: 34 MG/DL (ref 5–25)
CALCIUM SERPL-MCNC: 8.5 MG/DL (ref 8.3–10.1)
CHLORIDE SERPL-SCNC: 108 MMOL/L (ref 100–108)
CO2 SERPL-SCNC: 23 MMOL/L (ref 21–32)
CREAT SERPL-MCNC: 1.73 MG/DL (ref 0.6–1.3)
ERYTHROCYTE [DISTWIDTH] IN BLOOD BY AUTOMATED COUNT: 14.2 % (ref 11.6–15.1)
GFR SERPL CREATININE-BSD FRML MDRD: 28 ML/MIN/1.73SQ M
GLUCOSE SERPL-MCNC: 125 MG/DL (ref 65–140)
HCT VFR BLD AUTO: 29.2 % (ref 34.8–46.1)
HGB BLD-MCNC: 9.1 G/DL (ref 11.5–15.4)
MCH RBC QN AUTO: 27.8 PG (ref 26.8–34.3)
MCHC RBC AUTO-ENTMCNC: 31.2 G/DL (ref 31.4–37.4)
MCV RBC AUTO: 89 FL (ref 82–98)
PLATELET # BLD AUTO: 111 THOUSANDS/UL (ref 149–390)
PMV BLD AUTO: 10.6 FL (ref 8.9–12.7)
POTASSIUM SERPL-SCNC: 4.2 MMOL/L (ref 3.5–5.3)
RBC # BLD AUTO: 3.27 MILLION/UL (ref 3.81–5.12)
SODIUM SERPL-SCNC: 138 MMOL/L (ref 136–145)
WBC # BLD AUTO: 4.44 THOUSAND/UL (ref 4.31–10.16)

## 2019-02-15 PROCEDURE — 80048 BASIC METABOLIC PNL TOTAL CA: CPT | Performed by: PHYSICIAN ASSISTANT

## 2019-02-15 PROCEDURE — 85027 COMPLETE CBC AUTOMATED: CPT | Performed by: PHYSICIAN ASSISTANT

## 2019-02-15 PROCEDURE — 99238 HOSP IP/OBS DSCHRG MGMT 30/<: CPT | Performed by: PHYSICIAN ASSISTANT

## 2019-02-15 PROCEDURE — 99232 SBSQ HOSP IP/OBS MODERATE 35: CPT | Performed by: INTERNAL MEDICINE

## 2019-02-15 RX ADMIN — AMLODIPINE BESYLATE 5 MG: 5 TABLET ORAL at 09:53

## 2019-02-15 RX ADMIN — OXYCODONE HYDROCHLORIDE AND ACETAMINOPHEN 1 TABLET: 5; 325 TABLET ORAL at 15:53

## 2019-02-15 RX ADMIN — LABETALOL HYDROCHLORIDE 150 MG: 100 TABLET, FILM COATED ORAL at 09:52

## 2019-02-15 RX ADMIN — ACETYLCYSTEINE 1200 MG: 200 SOLUTION ORAL; RESPIRATORY (INHALATION) at 09:53

## 2019-02-15 RX ADMIN — HEPARIN SODIUM 5000 UNITS: 5000 INJECTION INTRAVENOUS; SUBCUTANEOUS at 06:34

## 2019-02-15 RX ADMIN — OXYCODONE HYDROCHLORIDE AND ACETAMINOPHEN 1 TABLET: 5; 325 TABLET ORAL at 06:32

## 2019-02-15 RX ADMIN — SERTRALINE HYDROCHLORIDE 50 MG: 50 TABLET ORAL at 09:53

## 2019-02-15 RX ADMIN — ASPIRIN 81 MG: 81 TABLET, COATED ORAL at 09:53

## 2019-02-15 RX ADMIN — Medication 1 SPRAY: at 09:53

## 2019-02-15 NOTE — ASSESSMENT & PLAN NOTE
Chronic Mesenteric Ischemia in setting of known SMA stenosis    S/P mesenteric agram via left brachial access 2/14- no significant SMA or celiac artery stenosis    Plan:  --Nephrology following  --Regular diet  --IVF  --F/U Am labs  --Tentative discharge later today

## 2019-02-15 NOTE — PROGRESS NOTES
Vascular Surgery    Chronic hypoxic respiratory failure in setting of COPD  Baseline Home O2 4L NC    Plan:  --Stable  --Continue Home O2 requirement    Elli Glass PA-C

## 2019-02-15 NOTE — PROGRESS NOTES
Vascular Surgery    Progress Note - [facility] 1940, 70 y o  female MRN: [id number]    Unit/Bed#: -01 Encounter: [id number]    Primary Care Provider: Malina Fisher DO   Date and time admitted to hospital: 2/13/2019  8:56 PM    * Chronic mesenteric ischemia [facility])  Assessment & Plan  Chronic Mesenteric Ischemia in setting of known SMA stenosis    S/P mesenteric agram via left brachial access 2/14- no significant SMA or celiac artery stenosis    Plan:  --Nephrology following  --Regular diet  --IVF  --F/U Am labs  --Tentative discharge later today    CKD (chronic kidney disease) stage 4, GFR 15-29 ml/min ([facility])  Assessment & Plan  Plan:  --Renal hydration pre/post arteriogram  --Nephrology following  --BMP pending      Subjective:  Patient doing well, left arm pain improved, denies abdominal pain    Vitals:  /65 (BP Location: Right arm)   Pulse 73   Temp 98 3 °F (36 8 °C) (Oral)   Resp 18   SpO2 92%     I/Os:  I/O last 3 completed shifts:   In: 1100 [I V :1100]  Out: 450 [Urine:450]  I/O this shift:  In: 220 [P O :220]  Out: -     Lab Results and Cultures:   Lab Results   Component Value Date    WBC 3 74 (L) 02/14/2019    HGB 10 4 (L) 02/14/2019    HCT 33 3 (L) 02/14/2019    MCV 89 02/14/2019     (L) 02/14/2019     Lab Results   Component Value Date    CALCIUM 9 2 02/14/2019    K 4 6 02/14/2019    CO2 24 02/14/2019     02/14/2019    BUN 35 (H) 02/14/2019    CREATININE 1 94 (H) 02/14/2019     Lab Results   Component Value Date    INR 0 98 02/14/2019    PROTIME 13 1 02/14/2019        Blood Culture: No results found for: BLOODCX,   Urinalysis: No results found for: Archer Levans, SPECGRAV, PHUR, LEUKOCYTESUR, NITRITE, PROTEINUA, GLUCOSEU, KETONESU, BILIRUBINUR, BLOODU,   Urine Culture: No results found for: URINECX,   Wound Culure: No results found for: WOUNDCULT    Medications:  Current Facility-Administered Medications   Medication Dose Route Frequency    acetylcysteine (MUCOMYST) 200 mg/mL oral solution 1,200 mg  1,200 mg Oral BID    amLODIPine (NORVASC) tablet 5 mg  5 mg Oral BID    aspirin (ECOTRIN LOW STRENGTH) EC tablet 81 mg  81 mg Oral Daily    calcium carbonate (TUMS) chewable tablet 500 mg  500 mg Oral BID PRN    famotidine (PEPCID) tablet 20 mg  20 mg Oral Daily PRN    heparin (porcine) subcutaneous injection 5,000 Units  5,000 Units Subcutaneous Q8H [facility]    labetalol (NORMODYNE) tablet 150 mg  150 mg Oral TID    lactated ringers infusion  75 mL/hr Intravenous Continuous    oxyCODONE-acetaminophen (PERCOCET) 5-325 mg per tablet 1 tablet  1 tablet Oral Q4H PRN    sertraline (ZOLOFT) tablet 50 mg  50 mg Oral Daily       Physical Exam:    General appearance: alert and oriented, in no acute distress  Lungs: clear to auscultation bilaterally  Heart: regular rate and rhythm, S1, S2 normal, no murmur, click, rub or gallop  Abdomen: soft, non-tender; bowel sounds normal; no masses,  no organomegaly  Extremities: extremities normal, warm and well-perfused; no cyanosis, clubbing, or edema    Wound/Incision:  Left arm puncture site incision clean, dry, and intact, + ecchymosis, no hematoma    Pulse exam:  Radial: Right: 2+ Left[de-identified] 2+      Nasir Hernandez PA-C  2/15/2019

## 2019-02-15 NOTE — SOCIAL WORK
Met with the patient to complete assessment and explain role of CM  Patient and her Simone Shelby, [phone] live in a one floor home with no CINTHIA  Patient is independent with care and uses continuous oxygen at home  DME provider is [employer]  Patient uses no device to ambulate in the home but uses a scooter for shopping  Her SO provided transport  She has no Advance Directive  PCP is dr Olga Lidia Reddy  She has prescription coverage and uses [facility] [place]  The patient will have transport at [place] and help at home  CM reviewed d/c planning process including the following: identifying help at home, patient preference for d/c planning needs, Discharge Lounge, [facility] Meds to Bed program, availability of treatment team to discuss questions or concerns patient and/or family may have regarding understanding medications and recognizing signs and symptoms once discharged  CM also encouraged patient to follow up with all recommended appointments after discharge  Patient advised of importance for patient and family to participate in managing patients medical well being  Patient/caregiver received discharge checklist  Content reviewed  Patient/caregiver encouraged to participate in discharge plan of care prior to discharge home

## 2019-02-15 NOTE — ASSESSMENT & PLAN NOTE
Chronic hypoxic respiratory failure in setting of COPD  Baseline Home O2 4L NC     Plan:  --Stable  --Continue Home O2 requirement

## 2019-02-15 NOTE — PLAN OF CARE
Problem: PAIN - ADULT  Goal: Verbalizes/displays adequate comfort level or baseline comfort level  Description  Interventions:  - Encourage patient to monitor pain and request assistance  - Assess pain using appropriate pain scale  - Administer analgesics based on type and severity of pain and evaluate response  - Implement non-pharmacological measures as appropriate and evaluate response  - Consider cultural and social influences on pain and pain management  - Notify physician/advanced practitioner if interventions unsuccessful or patient reports new pain  Outcome: Progressing     Problem: SAFETY ADULT  Goal: Patient will remain free of falls  Description  INTERVENTIONS:  - Assess patient frequently for physical needs  -  Identify cognitive and physical deficits and behaviors that affect risk of falls    -  [place] fall precautions as indicated by assessment   - Educate patient/family on patient safety including physical limitations  - Instruct patient to call for assistance with activity based on assessment  - Modify environment to reduce risk of injury  - Consider OT/PT consult to assist with strengthening/mobility  Outcome: Progressing

## 2019-02-15 NOTE — PROGRESS NOTES
Progress Note - Nephrology   Dimitry Duckworth 70 y o  female MRN: [id number]  Unit/Bed#: -01 Encounter: [id number]      Assessment / Plan:      1  CKD stage 3/4  · Likely secondary to hypertension  Additionally, she stated that she had a significant episode of CASSANDRA after her EVAR in   Renal artery stenosis is a consideration as well  · Baseline creatinine is mostly 1 6-2  The patient is currently at her baseline creatinine after dye exposure on   · Follow-up with Dr Karel Durán on discharge  2  Hypertension  · Blood pressure is currently acceptable as we want to avoid relative hypotension howard dye exposure  She will follow-up with Dr Karel Durán for further blood pressure control  3  Celiac/SMA stenosis  4  Anemia - normal MCV and RDW  · Has history of lymphoma and follows with Hematology    5  Thrombocytopenia  · Appears chronic in nature          Subjective: The patient was seen and examined at 10:45 a m  Moises Navarrete She denied any shortness of breath, chest pain or pressure, abdominal pain, vomiting, diarrhea  Objective:     Vitals: Blood pressure 136/70, pulse 80, temperature 98 °F (36 7 °C), temperature source Oral, resp  rate 18, SpO2 98 %  ,There is no height or weight on file to calculate BMI  Temp (24hrs), Av 2 °F (36 8 °C), Min:98 °F (36 7 °C), Max:98 5 °F (36 9 °C)      Weight (last 2 days)     None                 Intake/Output Summary (Last 24 hours) at 2/15/2019 1200  Last data filed at 2019 2000  Gross per 24 hour   Intake 720 ml   Output    Net 720 ml     I/O last 24 hours: In: 36 [P O :220; I V :600]  Out: -         Physical Exam    Physical Exam   Constitutional: No distress  Neck: No JVD present  Cardiovascular: Normal heart sounds  Exam reveals no gallop and no friction rub  Pulmonary/Chest: Effort normal and breath sounds normal  No respiratory distress  She has no wheezes  She has no rales  Abdominal: Soft  Bowel sounds are normal  She exhibits no distension  There is no tenderness  There is no rebound and no guarding  Musculoskeletal: She exhibits no edema  Skin: She is not diaphoretic  Vitals reviewed  Invasive Devices          None          Medications:    Scheduled Meds:  Current Facility-Administered Medications:  amLODIPine 5 mg Oral BID Mireya Bailey PA-C    aspirin 81 mg Oral Daily Mireya Bailey PA-C    calcium carbonate 500 mg Oral BID PRN Mireya Bailey PA-C    famotidine 20 mg Oral Daily PRN Mireya Bailey PA-C    heparin (porcine) 5,000 Units Subcutaneous Pending sale to [facility] Mireya Bailey PA-C    labetalol 150 mg Oral TID Mireya Bailey PA-C    lactated ringers 75 mL/hr Intravenous Continuous Kentrell Walls MD Last Rate: Stopped (02/14/19 1412)   oxyCODONE-acetaminophen 1 tablet Oral Q4H PRN Mireya Bailey PA-C    phenol 1 spray Mouth/Throat Q2H PRN Eda Chu MD    sertraline 50 mg Oral Daily Mireya Bailey PA-C        PRN Meds: calcium carbonate    famotidine    oxyCODONE-acetaminophen    phenol    Continuous Infusions:  lactated ringers 75 mL/hr Last Rate: Stopped (02/14/19 1412)           LAB RESULTS:      Results from last 7 days   Lab Units 02/15/19  0519 02/14/19  0625   WBC Thousand/uL 4 44 3 74*   HEMOGLOBIN g/dL 9 1* 10 4*   HEMATOCRIT % 29 2* 33 3*   PLATELETS Thousands/uL 111* 120*   NEUTROS PCT %  --  86*   LYMPHS PCT %  --  10*   MONOS PCT %  --  2*   EOS PCT %  --  0   POTASSIUM mmol/L 4 2 4 6   CHLORIDE mmol/L 108 104   CO2 mmol/L 23 24   BUN mg/dL 34* 35*   CREATININE mg/dL 1 73* 1 94*   CALCIUM mg/dL 8 5 9 2       CUTURES:  No results found for: Chip Collar              PLEASE NOTE:  This encounter was completed utilizing the [employer] Direct Speech Voice Recognition Software   Grammatical errors, random word insertions, pronoun errors and incomplete sentences are occasional consequences of the system due to software limitations, ambient noise and hardware issues  These may be missed by proof reading prior to affixing electronic signature  Any questions or concerns about the content, text or information contained within the body of this dictation should be directly addressed to the physician for clarification  Please do not hesitate to call me directly if you have any any questions or concerns

## 2019-02-15 NOTE — PLAN OF CARE
Problem: PAIN - ADULT  Goal: Verbalizes/displays adequate comfort level or baseline comfort level  Description  Interventions:  - Encourage patient to monitor pain and request assistance  - Assess pain using appropriate pain scale  - Administer analgesics based on type and severity of pain and evaluate response  - Implement non-pharmacological measures as appropriate and evaluate response  - Consider cultural and social influences on pain and pain management  - Notify physician/advanced practitioner if interventions unsuccessful or patient reports new pain  2/15/2019 1440 by Jodie Hollingsworth RN  Outcome: Completed  2/15/2019 1008 by Jodie Hollingsworth RN  Outcome: Progressing     Problem: SAFETY ADULT  Goal: Patient will remain free of falls  Description  INTERVENTIONS:  - Assess patient frequently for physical needs  -  Identify cognitive and physical deficits and behaviors that affect risk of falls    -  [place] fall precautions as indicated by assessment   - Educate patient/family on patient safety including physical limitations  - Instruct patient to call for assistance with activity based on assessment  - Modify environment to reduce risk of injury  - Consider OT/PT consult to assist with strengthening/mobility  2/15/2019 1440 by Jodie Hollingsworth RN  Outcome: Completed  2/15/2019 1008 by Jodie Hollingsworth RN  Outcome: Progressing

## 2019-02-15 NOTE — PLAN OF CARE
Problem: PAIN - ADULT  Goal: Verbalizes/displays adequate comfort level or baseline comfort level  Description  Interventions:  - Encourage patient to monitor pain and request assistance  - Assess pain using appropriate pain scale  - Administer analgesics based on type and severity of pain and evaluate response  - Implement non-pharmacological measures as appropriate and evaluate response  - Consider cultural and social influences on pain and pain management  - Notify physician/advanced practitioner if interventions unsuccessful or patient reports new pain  Outcome: Progressing     Problem: SAFETY ADULT  Goal: Patient will remain free of falls  Description  INTERVENTIONS:  - Assess patient frequently for physical needs  -  Identify cognitive and physical deficits and behaviors that affect risk of falls    -  [place] fall precautions as indicated by assessment   - Educate patient/family on patient safety including physical limitations  - Instruct patient to call for assistance with activity based on assessment  - Modify environment to reduce risk of injury  - Consider OT/PT consult to assist with strengthening/mobility  Outcome: Progressing

## 2019-02-15 NOTE — ASSESSMENT & PLAN NOTE
Chronic Mesenteric Ischemia in setting of known SMA stenosis    S/P mesenteric agram via left brachial access 2/14- no significant SMA or celiac artery stenosis    Plan:  --Regular diet  --Outpatient follow-up in the Vascular Center

## 2019-02-15 NOTE — DISCHARGE SUMMARY
Vascular Surgery    Discharge- Jame Rodriguez 1940, 70 y o  female MRN: [id number]    Unit/Bed#: -01 Encounter: [id number]    Primary Care Provider: Savi Haley DO   Date and time admitted to hospital: 2/13/2019  8:56 PM    * Chronic mesenteric ischemia [facility])  Assessment & Plan  Chronic Mesenteric Ischemia in setting of known SMA stenosis    S/P mesenteric agram via left brachial access 2/14- no significant SMA or celiac artery stenosis    Plan:  --Regular diet  --Outpatient follow-up in the Vascular Center    CKD (chronic kidney disease) stage 4, GFR 15-29 ml/min ([facility])  Assessment & Plan  Plan:  --Outpatient follow-up with Dr Daphne Lainez  --BMP Monday    Chronic respiratory failure with hypoxia ([facility] )  Assessment & Plan  Chronic hypoxic respiratory failure in setting of COPD  Baseline Home O2 4L NC     Plan:  --Stable  --Continue Home O2 requirement          Secondary Diagnosis:  Past Medical History:   Diagnosis Date    AAA (abdominal aortic aneurysm) ([facility])     s/p EVAR    Anxiety     Aortic aneurysm (HCC)     mid descending aorta    Asthma     Carotid stenosis, bilateral     Chronic back pain     CKD (chronic kidney disease) stage 4, GFR 15-29 ml/min ([facility])     COPD (chronic obstructive pulmonary disease) [facility]     Depression     Emphysema of lung ([facility])     chronic home O2    Fatty liver     Fibromyalgia     Gastroparesis     HLD (hyperlipidemia)     Hyperparathyroidism ([facility]     Hypertension     Mesenteric artery stenosis ([facility])     Nephrolithiasis     Non Hodgkin's lymphoma ([facility] )     s/p Chemo    On home O2     Osteoarthritis     PAD (peripheral artery disease) ([facility])     PUD (peptic ulcer disease)     Shortness of breath     Thoracoabdominal aortic aneurysm (TAAA) ([facility])      Past Surgical History:   Procedure Laterality Date    ABDOMINAL AORTIC ANEURYSM REPAIR      [place]    CARDIAC CATHETERIZATION      CATARACT EXTRACTION BILATERAL W/ ANTERIOR VITRECTOMY      COLONOSCOPY N/A 5/20/2016    Procedure: COLONOSCOPY;  Surgeon: Jane Suazo MD;  [facility]; Service:     ESOPHAGOGASTRODUODENOSCOPY N/A 5/20/2016    Procedure: ESOPHAGOGASTRODUODENOSCOPY (EGD); Surgeon: Jane Suazo MD;  [facility]; Service:     ESOPHAGOGASTRODUODENOSCOPY N/A 4/7/2016    Procedure: ESOPHAGOGASTRODUODENOSCOPY (EGD); Surgeon: Jane Suazo MD;  [facility]; Service:     EXPLORATORY LAPAROTOMY      removal lymph nodes    HYSTERECTOMY      NECK DISSECTION Left     VT Derenda Costain 3RD+ ORD SLCTV ABDL PEL/LXTR [facility] N/A 2/14/2019    Procedure: ARTERIOGRAM; left u/s guided percutaneous brachial access; sma angiography and celiac;  Surgeon: Ann-Marie Thao MD;  [facility];  Service: Vascular    RADIOFREQUENCY ABLATION NERVES      lower back        Admitting Diagnosis: Superior mesenteric artery stenosis ([address] ) [I77 1]    Attending: Dr Mely Eason    Consultants: Nephrology    Procedures Performed: Mesenteric Agram 12/14    Discharge Medications:  See after visit summary for reconciled discharge medications provided to patient and family  HPI:  Sb Copeland is a 61-year-old woman well known to me with history of prior EVAR [facility])  She has history of thoracic aortic aneurysm being followed by [facility]  History of bilateral asymptomatic carotid stenoses  History of celiac and SMA stenosis by duplex velocity criteria  She has had intermittent postprandial abdominal pain  Her symptoms are somewhat atypical for chronic mesenteric ischemia  Her symptoms have vacillated  with occasional postprandial abdominal pain  She has not had any significant weight loss  She has not reported any food fear  History of COPD/oxygen dependent  History of solitary functioning kidney/CKD  Since last office visit patient call the office with complaints of increasing abdominal pain after she eats  She is also complaining of constipation    She is interested in pursuing mesenteric angiography with possible intervention  She understands that she is at high risk for ventilator dependent respiratory failure as well as renal failure requiring dialysis  The procedure, benefits, risks, alternatives and anticipated postoperative course were reviewed  Patient is agreeable to proceed  Written consent was obtained  Plan:  Mesenteric angiogram with possible celiac/SMA intervention, possible left brachial artery access  Hospital Course: Pt was admitted electively admitted for renal hydration and preparation for mesenteric arteriogram  She was seen in consultation by nephrology and followed through her stay  She also required contrast preparation due to contrast allergy  She underwent mesenteric agram as scheduled and was found to not have significant celiac or SMA stenosis  Her creatinine remained stable following her procedure  She deemed appropriate for discharge home on POD#1  She was tolerating a diet without symptoms prior to discharge  Condition at Discharge: stable     Provisions for Follow-Up Care:  See after visit summary for information related to follow-up care and any pertinent home health orders  Disposition: Home    Discharge instructions/Information to patient and family:   See after visit summary for information provided to patient and family  Planned Readmission: No    Discharge Statement   I spent 30 minutes discharging the patient  This time was spent on the day of discharge  I had direct contact with the patient on the day of discharge  Additional documentation is required if more than 30 minutes were spent on discharge

## 2019-02-17 LAB
ABO GROUP BLD BPU: NORMAL
ABO GROUP BLD BPU: NORMAL
BPU ID: NORMAL
BPU ID: NORMAL
CROSSMATCH: NORMAL
CROSSMATCH: NORMAL
UNIT DISPENSE STATUS: NORMAL
UNIT DISPENSE STATUS: NORMAL
UNIT PRODUCT CODE: NORMAL
UNIT PRODUCT CODE: NORMAL
UNIT RH: NORMAL
UNIT RH: NORMAL

## 2019-02-19 ENCOUNTER — TELEPHONE (OUTPATIENT)
Dept: VASCULAR SURGERY | Facility: CLINIC | Age: 79
End: 2019-02-19

## 2019-02-19 NOTE — TELEPHONE ENCOUNTER
Pt is s/p mesenteric agram 2/14  She called because she believes Dr Ila Noel told her that she will not need the CT abdomen and pelvis that was previously ordered but it still printed out on her AVS  Sent email to Dr Ila Noel to advise  She is scheduled to return to office on 2/27

## 2019-02-26 NOTE — PROGRESS NOTES
Superior mesenteric artery stenosis (HCC)  Status post mesenteric angiography via left brachial artery percutaneous access  No evidence of hemodynamically significant SMA stenosis noted  Celiac trunk is presumed to be open with easy visibility of left gastric and splenic artery  The common hepatic was not visualized suggestive of underlying stenosis  Proper hepatic artery filling retrograde via SMA  I do not think her chronic abdominal symptoms are related to mesenteric arterial insufficiency  I recommend that she continue keeping a diary of what aggravates/alleviates her abdominal symptoms and set up a follow-up appointment with gastroenterology  AAA (abdominal aortic aneurysm) (HCC)  History of EVAR  Surveillance duplex scheduled for April      Assessment/Plan   Diagnoses and all orders for this visit:    Superior mesenteric artery stenosis ([address] )    Abdominal aortic aneurysm (AAA) without rupture ([address] )        No chief complaint on file  Subjective   Patient ID: Gaby Reynoso is a 70 y o  female  Chief complaint: pt is here to discuss AGRAM that was done by IR on 2/14/19  Pt is on asa  [place] returns to the office accompanied by her  to review results of her mesenteric angiogram   She continues to complain of intermittent abdominal pain  Denies any significant weight loss  Denies nausea vomiting  The following portions of the patient's history were reviewed and updated as appropriate: allergies, current medications, past family history, past medical history, past social history, past surgical history and problem list     Review of Systems   Constitutional: Negative  HENT: Negative  Eyes: Negative  Respiratory: Positive for cough and shortness of breath  Cardiovascular: Negative  Gastrointestinal: Negative  Endocrine: Negative  Genitourinary: Negative  Musculoskeletal: Positive for back pain  Skin: Negative  Allergic/Immunologic: Negative  Neurological: Positive for dizziness and weakness  Psychiatric/Behavioral: Negative  I have personally reviewed the ROS entered by MA and agree as documented  Patient Active Problem List   Diagnosis    Carotid stenosis, asymptomatic, bilateral    CKD (chronic kidney disease) stage 4, GFR 15-29 ml/min (HCC)    AAA (abdominal aortic aneurysm) (HCC)    [facility]lic[facility]    Hypertension    Pulmonary emphysema ([facility]    Superior mesenteric artery stenosis ([facility] )    Thoracic aortic aneurysm without rupture ([facility] )    Chronic mesenteric ischemia ([facility] )    Chronic respiratory failure with hypoxia ([facility]       Past Surgical History:   Procedure Laterality Date    ABDOMINAL [place]ORTIC [place]    CARDIAC CATHETERIZATION      CATARACT EXTRACTION BILATERAL W/ ANTERIOR VITRECTOMY      COLONOSCOPY N/A 5/20/2016    Procedure: COLONOSCOPY;  Surgeon: Leanne Sheffield MD;  [facility]; Service:     ESOPHAGOGASTRODUODENOSCOPY N/A 5/20/2016    Procedure: ESOPHAGOGASTRODUODENOSCOPY (EGD); Surgeon: Leanne Sheffield MD;  [facility]; Service:     ESOPHAGOGASTRODUODENOSCOPY N/A 4/7/2016    Procedure: ESOPHAGOGASTRODUODENOSCOPY (EGD); Surgeon: Leanne Sheffield MD;  [facility]; Service:     EXPLORATORY LAPAROTOMY      removal lymph nodes    HYSTERECTOMY      IR VISCERAL ANGIOGRAPHY / INTERVENTION  2/14/2019    NECK DISSECTION Left     [place] SLCTV CATHJ 3RD+ ORD SLCTV ABDL PEL/LXTR [facility] N/A 2/14/2019    Procedure: ARTERIOGRAM; left u/s guided percutaneous brachial access; sma angiography and celiac;  Surgeon: Liz Meehan MD;  [facility];  Service: Vascular    RADIOFREQUENCY ABLATION NERVES      lower back       History reviewed  No pertinent family history      Social History     Socioeconomic History    Marital status:      Spouse name: Not on file    Number of children: Not on file    Years of education: Not on file    Highest education level: Not on file   Occupational History    Not on file   Social Needs    Financial resource strain: Not on file    Food insecurity:     Worry: Not on file     Inability: Not on file    Transportation needs:     Medical: Not on file     Non-medical: Not on file   Tobacco Use    Smoking status: Former Smoker    Smokeless tobacco: Former User     Quit date: 2/13/2012    Tobacco comment: pt states she quit 2012   Substance and Sexual Activity    Alcohol use: No    Drug use: No    Sexual activity: Not on file   Lifestyle    Physical activity:     Days per week: Not on file     Minutes per session: Not on file    Stress: Not on file   Relationships    Social connections:     Talks on phone: Not on file     Gets together: Not on file     Attends Mu-ism service: Not on file     Active member of club or organization: Not on file     Attends meetings of clubs or organizations: Not on file     Relationship status: Not on file    Intimate partner violence:     Fear of current or ex partner: Not on file     Emotionally abused: Not on file     Physically abused: Not on file     Forced sexual activity: Not on file   Other Topics Concern    Not on file   Social History Narrative    Not on file       Allergies   Allergen Reactions    Iodinated Diagnostic Agents Anaphylaxis    Iodine Anaphylaxis     aslo ivp dye  shock    Ciprofloxacin      Achilles tendonitis  Quinolones also    Eggs Or Egg-Derived Products      Stomach pain    Fish-Derived Products      Stomach pain WITH JUST CRAB    Influenza Vaccines Abdominal Pain     DUE TO EGG ALLERGY    Latex Rash     Rash itching SKIN TEARS    Medical Tape Itching    Nexium [Esomeprazole] Diarrhea     Headache,diarrhea,stomach pain    Other      Adhesive tears skin    Rituxan [Rituximab]     Sulfa Antibiotics      Severe headache         Current Outpatient Medications:     acyclovir (ZOVIRAX) 5 % ointment, Apply 1 application topically as needed , Disp: , Rfl:     amLODIPine (NORVASC) 5 mg tablet, Take 5 mg by mouth 2 (two) times a day, Disp: , Rfl:     aspirin (ECOTRIN LOW STRENGTH) 81 mg EC tablet, Take 81 mg by mouth daily, Disp: , Rfl:     Biotin w/ Vitamins C & E (HAIR SKIN & NAILS GUMMIES PO), Take 1 capsule by mouth daily, Disp: , Rfl:     calcitriol (ROCALTROL) 0 25 mcg capsule, Take 0 25 mcg by mouth 3 (three) times a week MON , WED, FRI, Disp: , Rfl:     Cholecalciferol (VITAMIN D-3 PO), Take 1,000 mcg by mouth daily , Disp: , Rfl:     famotidine-calcium carbonate-magnesium hydroxide (PEPCID COMPLETE) -165 MG CHEW, Chew 1 tablet daily as needed for heartburn, Disp: , Rfl:     L-Lysine 1000 MG TABS, Take 1,000 mg by mouth daily as needed , Disp: , Rfl:     labetalol (NORMODYNE) 300 mg tablet, Take 150 mg by mouth 3 (three) times a day, Disp: , Rfl:     ondansetron (ZOFRAN) 4 mg tablet, Take 4 mg by mouth every 6 (six) hours as needed for nausea or vomiting , Disp: , Rfl:     oxyCODONE-acetaminophen (PERCOCET) 5-325 mg per tablet, Take 1 tablet by mouth every 4 (four) hours as needed for moderate pain , Disp: , Rfl:     OXYGEN-HELIUM IN, Inhale 4 L/min continuous , Disp: , Rfl:     sertraline (ZOLOFT) 50 mg tablet, Take 50 mg by mouth daily  , Disp: , Rfl:     Objective       Physical Exam:    General appearance: alert and oriented, in no acute distress  Skin: Skin color, texture, turgor normal  No rashes or lesions  Neurologic: Grossly normal  Head: Normocephalic, without obvious abnormality, atraumatic  Eyes: conjunctivae/corneas clear, EOM's intact  Neck: no adenopathy, no carotid bruit, no JVD and supple, symmetrical, trachea midline  Back:  No CVA tenderness    Lungs: clear to auscultation bilaterally  Chest wall: no tenderness  Heart: regular rate and rhythm, S1, S2 normal, no murmur, click, rub or gallop  Abdomen: soft, non-tender; bowel sounds normal; no masses,  no organomegaly  Extremities: extremities normal, warm and well-perfused; no cyanosis, clubbing, or edema    Mesenteric angiography reviewed with patient    No angiographic evidence of significant SMA and celiac trunk stenosis

## 2019-02-27 ENCOUNTER — DOCUMENTATION (OUTPATIENT)
Dept: VASCULAR SURGERY | Facility: CLINIC | Age: 79
End: 2019-02-27

## 2019-02-27 ENCOUNTER — OFFICE VISIT (OUTPATIENT)
Dept: VASCULAR SURGERY | Facility: CLINIC | Age: 79
End: 2019-02-27
Payer: MEDICARE

## 2019-02-27 VITALS
SYSTOLIC BLOOD PRESSURE: 132 MMHG | HEIGHT: 65 IN | DIASTOLIC BLOOD PRESSURE: 80 MMHG | TEMPERATURE: 98.5 F | BODY MASS INDEX: 31.95 KG/M2

## 2019-02-27 DIAGNOSIS — K55.1 SUPERIOR MESENTERIC ARTERY STENOSIS (HCC): Primary | ICD-10-CM

## 2019-02-27 DIAGNOSIS — I71.4 ABDOMINAL AORTIC ANEURYSM (AAA) WITHOUT RUPTURE (HCC): ICD-10-CM

## 2019-02-27 PROCEDURE — 99213 OFFICE O/P EST LOW 20 MIN: CPT | Performed by: SURGERY

## 2019-02-27 NOTE — PROGRESS NOTES
Spoke briefly with patient while following up from hospital admission  Pt states the procedure went well but had some complaints about her stay on the hospital and questions about her appointment  Reviewed her concerns regarding her stay in the hospital and answered questions about upcoming appointments

## 2019-02-27 NOTE — ASSESSMENT & PLAN NOTE
Status post mesenteric angiography via left brachial artery percutaneous access  No evidence of hemodynamically significant SMA stenosis noted  Celiac trunk is presumed to be open with easy visibility of left gastric and splenic artery  The common hepatic was not visualized suggestive of underlying stenosis  Proper hepatic artery filling retrograde via SMA  I do not think her chronic abdominal symptoms are related to mesenteric arterial insufficiency  I recommend that she continue keeping a diary of what aggravates/alleviates her abdominal symptoms and set up a follow-up appointment with gastroenterology

## 2019-04-08 DIAGNOSIS — I65.23 CAROTID STENOSIS, BILATERAL: Primary | ICD-10-CM

## 2019-05-03 NOTE — QUICK NOTE
Vascular Surgery    Seen in postop f/u from Mesenteric agram (L brachial access)  L arm remains in postop splint  Just started w/ some pain in the LUE  L radial pulse palpable  ACE wrap removed to just below antecub  + swelling & tenderness to touch of brachial/ antecubital area   + ecchymosis  + M/S    --Limb not threatened  --cont compression ACE wrap/ splint as ordered postop  --cont to monitor      Shital Hernández PA-C  2/14/2019
Vascular Surgery  Progress Note addendum    Chr Mesenteric ischemia w/ known SMA stenosis  CKD 3/4  --pt admitted for >2midn stays for renal maximization / IVF hydration for planned Mesenteric agam +/- SMA & celiac stenting (possible brachial access)   -procedure planned for today  -will require IVF hydration & Cr monitoring, post procedure      Shital Hernández PA-C  2/14/2019
clear bilaterally.  Pupils equal, round, and reactive to light.

## 2019-06-18 ENCOUNTER — HOSPITAL ENCOUNTER (OUTPATIENT)
Dept: NON INVASIVE DIAGNOSTICS | Facility: CLINIC | Age: 79
Discharge: HOME | End: 2019-06-18
Payer: MEDICARE

## 2019-06-18 DIAGNOSIS — I65.23 CAROTID STENOSIS, BILATERAL: ICD-10-CM

## 2019-06-18 DIAGNOSIS — K55.1 SUPERIOR MESENTERIC ARTERY STENOSIS (HCC): ICD-10-CM

## 2019-06-18 PROCEDURE — 93880 EXTRACRANIAL BILAT STUDY: CPT

## 2019-06-18 PROCEDURE — 93975 VASCULAR STUDY: CPT

## 2019-06-18 PROCEDURE — 1124F ACP DISCUSS-NO DSCNMKR DOCD: CPT | Performed by: SURGERY

## 2019-06-20 PROCEDURE — 93880 EXTRACRANIAL BILAT STUDY: CPT | Performed by: SURGERY

## 2019-06-20 PROCEDURE — 93975 VASCULAR STUDY: CPT | Performed by: SURGERY

## 2019-07-25 DIAGNOSIS — I71.4 ABDOMINAL AORTIC ANEURYSM (AAA) WITHOUT RUPTURE (HCC): Primary | ICD-10-CM

## 2019-08-21 ENCOUNTER — TELEPHONE (OUTPATIENT)
Dept: VASCULAR SURGERY | Facility: CLINIC | Age: 79
End: 2019-08-21

## 2019-10-14 ENCOUNTER — HOSPITAL ENCOUNTER (OUTPATIENT)
Dept: NON INVASIVE DIAGNOSTICS | Facility: CLINIC | Age: 79
Discharge: HOME | End: 2019-10-14
Payer: MEDICARE

## 2019-10-14 DIAGNOSIS — I71.4 ABDOMINAL AORTIC ANEURYSM WITHOUT RUPTURE (HCC): ICD-10-CM

## 2019-10-14 PROCEDURE — 93978 VASCULAR STUDY: CPT

## 2019-10-14 PROCEDURE — 93978 VASCULAR STUDY: CPT | Performed by: SURGERY

## 2020-02-20 NOTE — ASSESSMENT & PLAN NOTE
Recommend mesenteric angiogram with possible intervention  Patient to discuss further with her  and will notify our office whether interested in proceeding with recommended procedure  English

## 2020-08-28 DIAGNOSIS — I65.23 CAROTID STENOSIS, BILATERAL: Primary | ICD-10-CM

## 2020-10-01 ENCOUNTER — TELEPHONE (OUTPATIENT)
Dept: ADMINISTRATIVE | Facility: HOSPITAL | Age: 80
End: 2020-10-01

## 2020-10-01 DIAGNOSIS — I71.4 ABDOMINAL AORTIC ANEURYSM (AAA) WITHOUT RUPTURE (HCC): Primary | ICD-10-CM

## 2020-11-17 ENCOUNTER — HOSPITAL ENCOUNTER (OUTPATIENT)
Dept: NON INVASIVE DIAGNOSTICS | Facility: CLINIC | Age: 80
Discharge: HOME | End: 2020-11-17
Payer: MEDICARE

## 2020-11-17 DIAGNOSIS — I65.23 CAROTID STENOSIS, BILATERAL: ICD-10-CM

## 2020-11-17 PROCEDURE — 93880 EXTRACRANIAL BILAT STUDY: CPT

## 2020-11-18 PROCEDURE — 93880 EXTRACRANIAL BILAT STUDY: CPT | Performed by: SURGERY

## 2020-12-09 ENCOUNTER — TELEPHONE (OUTPATIENT)
Dept: VASCULAR SURGERY | Facility: CLINIC | Age: 80
End: 2020-12-09

## 2021-01-15 NOTE — ASSESSMENT & PLAN NOTE
CT Calcium Risk Screening Questions:    1. Patient's age is over 35 years old? 57 year old (If not, the patient must consult their primary care or cardiology physician for a referral)      2. Have you been previously diagnosed with heart disease, a history of angioplasty, stenting of the heart, or coronary artery bypass surgery? No (If yes, refer the patient back to their primary care or cardiology provider and do not proceed with scheduling. This test may not be appropriate for patients already diagnosed with heart disease because the test is a screening for the presence or absence of the disease.)    3. Do you have at least “two” of the following risk factors: (check all that apply)  [] Men over age 45  [x] Women over age 55  [] Obesity  [] Physically inactive (less than 30-60 minutes of exercise each week)  [] Diabetes  [x] Family history of coronary artery disease or stroke  [x] High cholesterol  [] Depression  [] High blood pressure  [] Smoker    *If any of the below applies, “do not” schedule the test and ask the patient to consult their primary care physician or cardiologist for a referral.  · Patient is under 35  • Is currently pregnant, or think they might be pregnant, or is currently breastfeeding.  · Has been previously diagnosed with heart disease  · Doesn’t meet at least two of the risk factors  · Has a pacemaker    Terms and conditions: (Read through with patient)  · I understand this is NOT a diagnostic examination or therapy session  · I understand that heart scans use a type of X-ray and therefore exposure to radiation   · I understand the price of the test is $49. This is self-pay only and will not be billed to my insurance company  • If your Primary Care Physician:  o Is not an [facility] physician, it is your responsibility to inform him/her of your test results.   o Is an [facility] physician, and you would like us to send results, we will do so. 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 Confirm PCP: Cheyenne Lorenz MD     Patient met Criteria? Yes

## 2021-02-12 DIAGNOSIS — Z23 ENCOUNTER FOR IMMUNIZATION: ICD-10-CM

## 2021-05-27 DIAGNOSIS — I71.4 ABDOMINAL AORTIC ANEURYSM (AAA) WITHOUT RUPTURE (HCC): Primary | ICD-10-CM

## 2021-07-01 ENCOUNTER — TELEPHONE (OUTPATIENT)
Dept: VASCULAR SURGERY | Facility: CLINIC | Age: 81
End: 2021-07-01

## 2021-07-01 NOTE — TELEPHONE ENCOUNTER
Attempted to contact patient to schedule EVAR duplex and Office Visit  Requested patient call [phone] option [phone] to schedule appointment(s)        Routine EVAR Follow Up Schedule     Time Frame 1 m 3 m  5 m 1 yr 1 5 yr 2 yr 2 5 yr 3 yr  3 5 yr 4 5 yr 5 yr  8 yr   CTA   Abd pel w and wo []              CT abd pel wo contrast    []  []  []   []    EVAR Duplex  [] []  []  []   []  [x]  Due  12/10/21    OV []    []  []  [] []  [x]  Due  12/10/21       *Pt is due for EVAR duplex and OV for [facility]*      <<If you schedule pt for their apts, please route conversation to Caren Cruz>>

## 2022-03-25 ENCOUNTER — TRANSCRIBE ORDERS (OUTPATIENT)
Dept: VASCULAR SURGERY | Facility: CLINIC | Age: 82
End: 2022-03-25

## 2022-03-25 DIAGNOSIS — I65.23 CAROTID STENOSIS, ASYMPTOMATIC, BILATERAL: Primary | ICD-10-CM

## 2022-05-02 ENCOUNTER — TELEPHONE (OUTPATIENT)
Dept: VASCULAR SURGERY | Facility: CLINIC | Age: 82
End: 2022-05-02

## 2022-05-02 NOTE — TELEPHONE ENCOUNTER
We received a referral from [facility] [place] for this patient to re-evaluate for chronic post prandial abdominal pain and SMA stenosis of 70%    Called patient to schedule appointment with Dr Sherlyn Pepper or an AP if possible, left message

## 2022-06-09 ENCOUNTER — HOSPITAL ENCOUNTER (OUTPATIENT)
Dept: NON INVASIVE DIAGNOSTICS | Facility: CLINIC | Age: 82
Discharge: HOME | End: 2022-06-09
Payer: MEDICARE

## 2022-06-09 DIAGNOSIS — I65.23 CAROTID STENOSIS, ASYMPTOMATIC, BILATERAL: ICD-10-CM

## 2022-06-09 DIAGNOSIS — I71.4 ABDOMINAL AORTIC ANEURYSM (AAA) WITHOUT RUPTURE (HCC): ICD-10-CM

## 2022-06-09 PROCEDURE — 93978 VASCULAR STUDY: CPT

## 2022-06-09 PROCEDURE — 93880 EXTRACRANIAL BILAT STUDY: CPT

## 2022-06-09 PROCEDURE — 93923 UPR/LXTR ART STDY 3+ LVLS: CPT

## 2022-06-09 PROCEDURE — 93880 EXTRACRANIAL BILAT STUDY: CPT | Performed by: SURGERY

## 2022-06-15 ENCOUNTER — OFFICE VISIT (OUTPATIENT)
Dept: VASCULAR SURGERY | Facility: CLINIC | Age: 82
End: 2022-06-15
Payer: MEDICARE

## 2022-06-15 VITALS
SYSTOLIC BLOOD PRESSURE: 104 MMHG | HEART RATE: 61 BPM | OXYGEN SATURATION: 94 % | HEIGHT: 64 IN | DIASTOLIC BLOOD PRESSURE: 80 MMHG | WEIGHT: 202 LBS | BODY MASS INDEX: 34.49 KG/M2

## 2022-06-15 DIAGNOSIS — I71.2 THORACIC AORTIC ANEURYSM WITHOUT RUPTURE (HCC): ICD-10-CM

## 2022-06-15 DIAGNOSIS — J43.9 PULMONARY EMPHYSEMA, UNSPECIFIED EMPHYSEMA TYPE (HCC): ICD-10-CM

## 2022-06-15 DIAGNOSIS — N18.4 CKD (CHRONIC KIDNEY DISEASE) STAGE 4, GFR 15-29 ML/MIN (HCC): Chronic | ICD-10-CM

## 2022-06-15 DIAGNOSIS — I65.23 ASYMPTOMATIC CAROTID ARTERY STENOSIS WITHOUT INFARCTION, BILATERAL: ICD-10-CM

## 2022-06-15 DIAGNOSIS — K55.1 SUPERIOR MESENTERIC ARTERY STENOSIS (HCC): ICD-10-CM

## 2022-06-15 DIAGNOSIS — Z86.79 STATUS POST ENDOVASCULAR ANEURYSM REPAIR (EVAR): Primary | ICD-10-CM

## 2022-06-15 DIAGNOSIS — I71.4 ABDOMINAL AORTIC ANEURYSM (AAA) WITHOUT RUPTURE (HCC): ICD-10-CM

## 2022-06-15 DIAGNOSIS — Z98.890 STATUS POST ENDOVASCULAR ANEURYSM REPAIR (EVAR): Primary | ICD-10-CM

## 2022-06-15 DIAGNOSIS — I65.23 CAROTID STENOSIS, ASYMPTOMATIC, BILATERAL: ICD-10-CM

## 2022-06-15 DIAGNOSIS — J96.11 CHRONIC RESPIRATORY FAILURE WITH HYPOXIA (HCC): ICD-10-CM

## 2022-06-15 PROCEDURE — 99204 OFFICE O/P NEW MOD 45 MIN: CPT | Performed by: SURGERY

## 2022-06-15 RX ORDER — FLUTICASONE PROPIONATE 50 MCG
2 SPRAY, SUSPENSION (ML) NASAL DAILY
COMMUNITY
Start: 2022-01-27

## 2022-06-15 RX ORDER — FUROSEMIDE 20 MG/1
TABLET ORAL
COMMUNITY
Start: 2022-05-31

## 2022-06-15 RX ORDER — ALBUTEROL SULFATE 90 UG/1
2 AEROSOL, METERED RESPIRATORY (INHALATION)
COMMUNITY

## 2022-06-15 RX ORDER — ROSUVASTATIN CALCIUM 10 MG/1
TABLET, COATED ORAL
COMMUNITY
Start: 2022-04-09

## 2022-06-15 RX ORDER — FAMOTIDINE 40 MG/1
40 TABLET, FILM COATED ORAL 2 TIMES DAILY
COMMUNITY
Start: 2022-05-06

## 2022-06-15 RX ORDER — FUROSEMIDE 20 MG/1
TABLET ORAL
COMMUNITY
Start: 2020-11-07 | End: 2022-06-15 | Stop reason: ALTCHOICE

## 2022-06-15 RX ORDER — LOSARTAN POTASSIUM 25 MG/1
25 TABLET ORAL 2 TIMES DAILY
COMMUNITY
Start: 2020-03-01

## 2022-06-15 RX ORDER — ESTRADIOL 0.1 MG/G
CREAM VAGINAL
COMMUNITY
Start: 2022-05-31

## 2022-06-15 RX ORDER — FLUTICASONE FUROATE, UMECLIDINIUM BROMIDE AND VILANTEROL TRIFENATATE 100; 62.5; 25 UG/1; UG/1; UG/1
1 POWDER RESPIRATORY (INHALATION) DAILY
COMMUNITY

## 2022-06-15 RX ORDER — METOPROLOL SUCCINATE 50 MG/1
100 TABLET, EXTENDED RELEASE ORAL
COMMUNITY
Start: 2022-02-09

## 2022-06-15 NOTE — ASSESSMENT & PLAN NOTE
Hx EVAR in 2015 at outside hospital [facility]  Most recent EVAR duplex with no appreciable endoleak  The aneurysm sac is stable    Continue surveillance with 1 year EVAR duplex

## 2022-06-15 NOTE — ASSESSMENT & PLAN NOTE
Patient with known infrarenal abdominal aortic aneurysm status post EVAR back in 2015 down in [place]  She subsequently establish care with our practice  She has known thoracoabdominal aortic aneurysm for which she was referred to [address] [place] [address] [address] for ongoing surveillance and treatment if indicated  She has a scheduled virtual visit with their thoracic aortic practice coming up in October  Their notes have been reviewed in epic  She is not a surgical candidate due to her underlying comorbidities with end-stage pulmonary disease on oxygen around the clock as well as underlying chronic kidney disease  She is currently not on dialysis

## 2022-06-15 NOTE — ASSESSMENT & PLAN NOTE
Known bilateral ICA asymptomatic carotid stenoses  Most recent carotid duplex demonstrates bilateral 50-69% stenoses based on duplex velocity criteria  Continue current medical regimen  Continue surveillance with 1 year carotid duplex

## 2022-06-15 NOTE — ASSESSMENT & PLAN NOTE
Patient did undergo an mesenteric angiography back in 2019 which was unremarkable for any significant SMA stenosis  As such no interventions was undertaken at that time

## 2022-06-15 NOTE — PROGRESS NOTES
Assessment/Plan:    Superior mesenteric artery stenosis [facility])  Patient did undergo an mesenteric angiography back in 2019 which was unremarkable for any significant SMA stenosis  As such no interventions was undertaken at that time  Thoracic aortic aneurysm without rupture [facility])  Patient with known infrarenal abdominal aortic aneurysm status post EVAR back in 2015 down in [place]  She subsequently establish care with our practice  She has known thoracoabdominal aortic aneurysm for which she was referred to [address] [place] [address] [facility] for ongoing surveillance and treatment if indicated  She has a scheduled virtual visit with their thoracic aortic practice coming up in October  Their notes have been reviewed in epic  She is not a surgical candidate due to her underlying comorbidities with end-stage pulmonary disease on oxygen around the clock as well as underlying chronic kidney disease  She is currently not on dialysis  Carotid stenosis, asymptomatic, bilateral  Known bilateral ICA asymptomatic carotid stenoses  Most recent carotid duplex demonstrates bilateral 50-69% stenoses based on duplex velocity criteria  Continue current medical regimen  Continue surveillance with 1 year carotid duplex  AAA (abdominal aortic aneurysm) ([facility] [address] )  Hx EVAR in 2015 at outside hospital down in [place]  Most recent EVAR duplex with no appreciable endoleak  The aneurysm sac is stable  Continue surveillance with 1 year EVAR duplex       Diagnoses and all orders for this visit:    Status post endovascular aneurysm repair (EVAR)  -     VAS evar endovascular aortic repair duplex; Future    Asymptomatic carotid artery stenosis without infarction, bilateral  -     VAS carotid complete study;  Future    Superior mesenteric artery stenosis (HCC)    Pulmonary emphysema, unspecified emphysema type ([address] )    Chronic respiratory failure with hypoxia [facility])    Thoracic aortic aneurysm without rupture ([facility] [address] )    Carotid stenosis, asymptomatic, bilateral    CKD (chronic kidney disease) stage 4, GFR 15-29 ml/min (HCC)    Abdominal aortic aneurysm (AAA) without rupture (HCC)    Other orders  -     Multiple Vitamins-Minerals (HAIR SKIN AND NAILS FORMULA PO); Take 2 tablets by mouth daily  -     Discontinue: furosemide (LASIX) 20 mg tablet  -     Discontinue: Multiple Vitamin (MULTIVITAMIN ADULT PO); Take by mouth  -     albuterol (PROVENTIL HFA,VENTOLIN HFA) 90 mcg/act inhaler; Inhale 2 puffs  -     estradiol (ESTRACE) 0 1 mg/g vaginal cream; PLEASE SEE ATTACHED FOR DETAILED DIRECTIONS  -     famotidine (PEPCID) 40 MG tablet; Take 40 mg by mouth 2 (two) times a day  -     fluticasone (FLONASE) 50 mcg/act nasal spray; 2 sprays into each nostril daily  -     fluticasone-umeclidinium-vilanterol (Trelegy Ellipta) 100-62 5-25 MCG/INH inhaler; Inhale 1 puff daily  -     furosemide (LASIX) 20 mg tablet; PLEASE SEE ATTACHED FOR DETAILED DIRECTIONS  -     losartan (COZAAR) 25 mg tablet; Take 25 mg by mouth 2 (two) times a day  -     metoprolol succinate (TOPROL-XL) 50 mg 24 hr tablet; Take 100 mg by mouth  -     rosuvastatin (CRESTOR) 10 MG tablet; TAKE 1 TABLET BY MOUTH EVERY DAY AT NIGHT          Subjective:      Patient ID: Chico Morales is a [de-identified] y o  female  Patient is here today to review results of CV and EVAR duplex done 6/9/2022  Patient is s/p EVAR done 2/12/2015 in [place] and she is also s/p Angioplasty of SMA by Dr Jose Angel Ornelas 2/14/2019  Pt c/o chronic post prandial abdominal pain  Pt also c/o chronic back pain  She denies abd pain w/o eating  Pt denies any sudden loss of vision, trouble swallowing, slurred speech, TIA or Stroke symptoms  She c/o frequent headaches and dizziness  Pt is taking ASA 81 mg  She is a former smoker  Damon Melo returns to the office for routine scheduled visit to review surveillance carotid duplex as well as EVAR duplex  She denies any focal neurologic deficits    She reports that she and her  were quite ill with COVID requiring hospitalization  Of note She remains oxygen dependent since repair of her EVAR in 2015 down in [place]  Postop course at that time was complicated by ARDS and she has subsequently been on oxygen since then  She also has a large thoracoabdominal aortic aneurysm for which she was referred to [address] [place] [address]  She continues to undergo surveillance with their thoracic aortic practice  The following portions of the patient's history were reviewed and updated as appropriate: allergies, current medications, past family history, past medical history, past social history, past surgical history and problem list     Review of Systems   Constitutional: Negative  HENT: Negative  Eyes: Negative  Respiratory: Positive for shortness of breath  Pt on 4L O2 continuous  Cardiovascular: Negative  Gastrointestinal: Positive for abdominal pain  Endocrine: Negative  Genitourinary: Negative  Musculoskeletal: Positive for back pain  Skin: Negative  Allergic/Immunologic: Negative  Neurological: Positive for dizziness and headaches  Hematological: Negative  Psychiatric/Behavioral: Negative  Objective:      /80 (BP Location: Left arm, Patient Position: Sitting, Cuff Size: Standard)   Pulse 61   Ht 5' 3 5" (1 613 m)   Wt 91 6 kg (202 lb)   SpO2 94%   BMI 35 22 kg/m²          Physical Exam  Constitutional:       General: She is not in acute distress  Appearance: She is well-developed  HENT:      Head: Normocephalic and atraumatic  Eyes:      General: No scleral icterus  Conjunctiva/sclera: Conjunctivae normal    Neck:      Trachea: No tracheal deviation  Cardiovascular:      Rate and Rhythm: Normal rate  Pulmonary:      Effort: Pulmonary effort is normal    Abdominal:      General: There is no distension  Palpations: Abdomen is soft  There is no mass (no appreciable aortic pulsation/aneurysm)  Tenderness: There is no abdominal tenderness  There is no guarding or rebound  Musculoskeletal:         General: Normal range of motion  Cervical back: Normal range of motion and neck supple  Skin:     General: Skin is warm and dry  Neurological:      Mental Status: She is alert and oriented to person, place, and time  Psychiatric:         Mood and Affect: Mood normal          Behavior: Behavior normal          Thought Content:  Thought content normal          Judgment: Judgment normal

## 2023-06-14 ENCOUNTER — HOSPITAL ENCOUNTER (OUTPATIENT)
Dept: NON INVASIVE DIAGNOSTICS | Facility: CLINIC | Age: 83
Discharge: HOME | End: 2023-06-14
Payer: MEDICARE

## 2023-06-14 DIAGNOSIS — Z86.79 STATUS POST ENDOVASCULAR ANEURYSM REPAIR (EVAR): ICD-10-CM

## 2023-06-14 DIAGNOSIS — I65.23 ASYMPTOMATIC CAROTID ARTERY STENOSIS WITHOUT INFARCTION, BILATERAL: ICD-10-CM

## 2023-06-14 DIAGNOSIS — Z98.890 STATUS POST ENDOVASCULAR ANEURYSM REPAIR (EVAR): ICD-10-CM

## 2023-06-14 PROCEDURE — 93880 EXTRACRANIAL BILAT STUDY: CPT

## 2023-06-14 PROCEDURE — 93978 VASCULAR STUDY: CPT

## 2023-06-14 PROCEDURE — 93880 EXTRACRANIAL BILAT STUDY: CPT | Performed by: SURGERY

## 2023-06-14 PROCEDURE — 93923 UPR/LXTR ART STDY 3+ LVLS: CPT

## 2023-07-05 ENCOUNTER — OFFICE VISIT (OUTPATIENT)
Dept: VASCULAR SURGERY | Facility: CLINIC | Age: 83
End: 2023-07-05
Payer: MEDICARE

## 2023-07-05 VITALS
DIASTOLIC BLOOD PRESSURE: 80 MMHG | WEIGHT: 182.6 LBS | SYSTOLIC BLOOD PRESSURE: 140 MMHG | OXYGEN SATURATION: 96 % | BODY MASS INDEX: 31.84 KG/M2 | HEART RATE: 55 BPM

## 2023-07-05 DIAGNOSIS — I65.23 CAROTID STENOSIS, ASYMPTOMATIC, BILATERAL: Primary | ICD-10-CM

## 2023-07-05 DIAGNOSIS — N18.4 CKD (CHRONIC KIDNEY DISEASE) STAGE 4, GFR 15-29 ML/MIN (HCC): Chronic | ICD-10-CM

## 2023-07-05 DIAGNOSIS — I71.43 INFRARENAL ABDOMINAL AORTIC ANEURYSM (AAA) WITHOUT RUPTURE (HCC): ICD-10-CM

## 2023-07-05 DIAGNOSIS — J43.9 PULMONARY EMPHYSEMA, UNSPECIFIED EMPHYSEMA TYPE (HCC): ICD-10-CM

## 2023-07-05 DIAGNOSIS — K55.1 SUPERIOR MESENTERIC ARTERY STENOSIS (HCC): ICD-10-CM

## 2023-07-05 DIAGNOSIS — I71.23 ANEURYSM OF DESCENDING THORACIC AORTA WITHOUT RUPTURE (HCC): ICD-10-CM

## 2023-07-05 PROCEDURE — 99214 OFFICE O/P EST MOD 30 MIN: CPT | Performed by: SURGERY

## 2023-07-05 RX ORDER — FAMOTIDINE 20 MG/1
20 TABLET, FILM COATED ORAL DAILY
COMMUNITY

## 2023-07-05 NOTE — PROGRESS NOTES
Assessment/Plan:    Thoracic aortic aneurysm without rupture ([address])  Follows with Vascular surgeons in [place]    AAA (abdominal aortic aneurysm) ([address])  HX of EVAR at outside facility  Recent EVAR duplex with stable aneurysm sac with no evidence of endoleak. Continue surveillance. Patient and  very realistic and understand that with respects to her thoracic (thoracoabdominal aneurysm) that she would survive any open procedure. And it appears for time being that close observation will be continued with [facility]. Patient also expressed that she would not want to live with need for dialysis. She has had multiple conversation with family and has a great outlook in that she plans to enjoy life as much as she can. Superior mesenteric artery stenosis [facility])  Prior angiography didn't reveal any high-grade stenosis despite noninvasive arterial imaging. Patient denies any postprandial abdominal pain. Carotid stenosis, asymptomatic, bilateral  Known stable asymptomatic bilateral carotid stenoses. Most recent carotid duplex suggest a left greater than 70% stenosis with velocities of 253/62. Right 50 to 69% with velocities of 154/43. Recommend continued surveillance with periodic ultrasounds and continue medical therapy. Diagnoses and all orders for this visit:    Carotid stenosis, asymptomatic, bilateral  -     VAS carotid complete study; Future    Superior mesenteric artery stenosis (HCC)    Pulmonary emphysema, unspecified emphysema type ([address])    Aneurysm of descending thoracic aorta without rupture (HCC)  -     VAS evar endovascular aortic repair duplex; Future    Infrarenal abdominal aortic aneurysm (AAA) without rupture (HCC)    CKD (chronic kidney disease) stage 4, GFR 15-29 ml/min (HCC)    Other orders  -     famotidine (PEPCID) 20 mg tablet; Take 20 mg by mouth daily          Subjective:      Patient ID: Darian Kendall is a 82 y.o. female.     This patient is here to review results of an EVAR and CV done 6/14/23. She is taking asa 81mg and rosuvastatin. Isrrael Muro presents to the office to review surveillance carotid and EVAR duplexes. She remains O2 dependent secondary to underlying COPD/pulmonary emphysema. She denies any neurologic deficits. Denies any postprandial abdominal pain. The following portions of the patient's history were reviewed and updated as appropriate: allergies, current medications, past family history, past medical history, past social history, past surgical history and problem list.    Review of Systems   Constitutional: Positive for chills and fatigue. HENT: Negative. Eyes: Negative. Respiratory: Positive for shortness of breath. Cardiovascular: Negative. Gastrointestinal: Negative. Endocrine: Negative. Objective:      /80 (BP Location: Left arm, Patient Position: Sitting, Cuff Size: Large)   Pulse 55   Wt 82.8 kg (182 lb 9.6 oz)   SpO2 96%   BMI 31.84 kg/m²          Physical Exam  Constitutional:       General: She is not in acute distress. Appearance: She is well-developed. HENT:      Head: Normocephalic and atraumatic. Eyes:      General: No scleral icterus. Conjunctiva/sclera: Conjunctivae normal.   Neck:      Trachea: No tracheal deviation. Cardiovascular:      Rate and Rhythm: Normal rate and regular rhythm. Heart sounds: Normal heart sounds. Pulmonary:      Effort: Pulmonary effort is normal.      Breath sounds: Normal breath sounds. Abdominal:      General: There is no distension. Palpations: Abdomen is soft. There is no mass (no appreciable aortic pulsation/aneurysm). Tenderness: There is no abdominal tenderness. There is no guarding or rebound. Musculoskeletal:         General: Normal range of motion. Cervical back: Normal range of motion and neck supple. Skin:     General: Skin is warm and dry.    Neurological:      Mental Status: She is alert and oriented to person, place, and time.   Psychiatric:         Mood and Affect: Mood normal.         Behavior: Behavior normal.         Thought Content: Thought content normal.         Judgment: Judgment normal.             Carotid duplex:  CONCLUSION:     Impression  RIGHT:  There is 50-69% stenosis noted in the internal carotid artery. Plaque is  heterogenous and irregular. Vertebral artery flow is antegrade. There is no significant subclavian artery  disease. LEFT:  There is >70% stenosis noted in the internal carotid artery. Plaque is  heterogenous and irregular. Vertebral artery flow is antegrade. There is no significant subclavian artery  disease. Compared to previous study on 6/9/22, there is progression of disease in the  left ICA. Recommend repeat testing in 6 months as per protocol unless otherwise  Indicated. EVAR duplex:  CONCLUSION:  Impression  THIS IS A FOLLOW UP EXAM  Duplex evaluation of the abdominal aortic aneurysm post EVAR shows a widely  patent endograft without evidence of endoleak. Abdominal aortic sac measures approximately 3.3  cm  x  3.3 cm  Prior:  3.4 cm  The aorta proximal to the graft measures 5.1 x 5.4 cm. (Prior: 4.9cm)  The right common iliac sac measures 1.47 cm prior: 1.5 cm  The iliac arteries distal to the graft measures Rt - 0.7 and Lt - 0.8  There is a >70% stenosis noted in the celiac trunk and superior mesenteric  arteries. The renal arteries were not visualized. RIGHT:  Ankle/Brachial index: : Rt - 1.09 within the normal range. (Prior: 1.10)  Metatarsal Pressure: 161 mmHg  Great toe pressure of  65 mmHg is within the healing range    Prior:  102 mmHg  PVR/ PPG tracings are dampened. LEFT:  Ankle/Brachial index: :  - 1.12 within the normal range  (Prior: 0.91)  Metatarsal Pressure:  150 mmHg  Great toe pressure of Rt - 71 mmHg   Prior: 102 mmHg  PVR/ PPG tracings are dampened.

## 2023-07-12 NOTE — ASSESSMENT & PLAN NOTE
Prior angiography didn't reveal any high-grade stenosis despite noninvasive arterial imaging. Patient denies any postprandial abdominal pain.

## 2023-07-12 NOTE — ASSESSMENT & PLAN NOTE
HX of EVAR at outside facility  Recent EVAR duplex with stable aneurysm sac with no evidence of endoleak. Continue surveillance. Patient and  very realistic and understand that with respects to her thoracic (thoracoabdominal aneurysm) that she would survive any open procedure. And it appears for time being that close observation will be continued with [facility]. Patient also expressed that she would not want to live with need for dialysis. She has had multiple conversation with family and has a great outlook in that she plans to enjoy life as much as she can.

## 2023-07-12 NOTE — ASSESSMENT & PLAN NOTE
Known stable asymptomatic bilateral carotid stenoses. Most recent carotid duplex suggest a left greater than 70% stenosis with velocities of 253/62. Right 50 to 69% with velocities of 154/43. Recommend continued surveillance with periodic ultrasounds and continue medical therapy.

## 2024-03-26 ENCOUNTER — TELEPHONE (OUTPATIENT)
Dept: VASCULAR SURGERY | Facility: CLINIC | Age: 84
End: 2024-03-26

## 2024-03-26 NOTE — TELEPHONE ENCOUNTER
Snehal with [facility] called regarding a CTA their office did on 3/15/24. Snehal reports an incidental finding on CTA of AAA measuring 6.5cm states it was previously 5.8cm. Patient currently has an EVAR duplex scheduled for 6/21/24.  Snehal will fax the CTA to be scanned into patient's chart. [facility] is requesting she follows up with us sooner. Please advise.       Will call Snehal back at [phone] ext. [phone]

## 2024-03-26 NOTE — TELEPHONE ENCOUNTER
Patient needs OV with [facility]- please schedule this week.   Please get CTA images brought over for review.

## 2024-03-27 NOTE — TELEPHONE ENCOUNTER
Called and s/w pt regarding below. Pt stated she is currently admitted to [facility] for sob, referred by Pulmonology . Pt stated she will call our office once she is discharged to set up asap appt.

## 2024-04-01 ENCOUNTER — TELEPHONE (OUTPATIENT)
Dept: VASCULAR SURGERY | Facility: CLINIC | Age: 84
End: 2024-04-01

## 2024-04-02 ENCOUNTER — OFFICE VISIT (OUTPATIENT)
Dept: VASCULAR SURGERY | Facility: CLINIC | Age: 84
End: 2024-04-02
Payer: COMMERCIAL

## 2024-04-02 VITALS
BODY MASS INDEX: 31.84 KG/M2 | DIASTOLIC BLOOD PRESSURE: 72 MMHG | SYSTOLIC BLOOD PRESSURE: 116 MMHG | HEIGHT: 64 IN | HEART RATE: 76 BPM | OXYGEN SATURATION: 92 %

## 2024-04-02 DIAGNOSIS — J43.9 PULMONARY EMPHYSEMA, UNSPECIFIED EMPHYSEMA TYPE (HCC): ICD-10-CM

## 2024-04-02 DIAGNOSIS — I71.23 ANEURYSM OF DESCENDING THORACIC AORTA WITHOUT RUPTURE (HCC): Primary | ICD-10-CM

## 2024-04-02 DIAGNOSIS — I65.23 CAROTID STENOSIS, ASYMPTOMATIC, BILATERAL: ICD-10-CM

## 2024-04-02 DIAGNOSIS — J96.11 CHRONIC RESPIRATORY FAILURE WITH HYPOXIA (HCC): ICD-10-CM

## 2024-04-02 PROBLEM — N18.5 STAGE 5 CHRONIC KIDNEY DISEASE NOT ON CHRONIC DIALYSIS (HCC): Status: ACTIVE | Noted: 2024-04-02

## 2024-04-02 PROCEDURE — 99214 OFFICE O/P EST MOD 30 MIN: CPT | Performed by: SURGERY

## 2024-04-02 RX ORDER — FERROUS SULFATE 325(65) MG
325 TABLET ORAL
COMMUNITY

## 2024-04-02 RX ORDER — PREGABALIN 75 MG/1
CAPSULE ORAL
COMMUNITY
Start: 2024-02-20

## 2024-04-02 RX ORDER — SERTRALINE HYDROCHLORIDE 100 MG/1
100 TABLET, FILM COATED ORAL DAILY
COMMUNITY
Start: 2024-01-25

## 2024-04-02 RX ORDER — FLUTICASONE FUROATE, UMECLIDINIUM BROMIDE AND VILANTEROL TRIFENATATE 200; 62.5; 25 UG/1; UG/1; UG/1
1 POWDER RESPIRATORY (INHALATION) DAILY
COMMUNITY
Start: 2024-03-12

## 2024-04-02 RX ORDER — AMLODIPINE BESYLATE 5 MG/1
5 TABLET ORAL DAILY
COMMUNITY
Start: 2024-01-23

## 2024-04-02 NOTE — ASSESSMENT & PLAN NOTE
Vicki is a pleasant 84-year-old woman with multiple comorbidities who presents to the office accompanied by her .  She is well-known to the vascular practice.  She has history of EVAR back in [place] many years ago.  She subsequently has developed a progressively enlarging descending thoracic/thoracoabdominal aneurysm.  She was referred to [facility] where she had been followed for some time and undergoing surveillance.  Per her  she has been hospitalized twice this year.  She was recently discharged following a bout of acute respiratory failure.  Her renal function has significantly worsened with a creatinine of 3.8/GFR of 11.  She is on chronic oxygen via nasal cannula secondary to advanced COPD/emphysema.  Overall she has had significant clinical deterioration.  She has elected to no longer follow with [facility].  Notes from [facility] have been reviewed prior to today's office visit.  She has elected to not proceed with any aneurysm repair even in the event of rupture.  She understands the almost certainty of immediate death with aneurysm rupture.  We had a lengthy discussion today in the office as to the utility of ongoing routine regular surveillance.  We agree that such routine surveillance should be at this point terminated/discontinued.  She may follow-up with our office on an as needed basis.  Of note she is nearing need for dialysis. She states that her doctor is arranging for dialysis access in near future. In meantime should she need dialysis she would require a temp. dialysis catheter.

## 2024-04-02 NOTE — PROGRESS NOTES
Assessment/Plan:    Thoracic aortic aneurysm without rupture (HCC)  Vicki is a pleasant 84-year-old woman with multiple comorbidities who presents to the office accompanied by her .  She is well-known to the vascular practice.  She has history of EVAR back in [place] many years ago.  She subsequently has developed a progressively enlarging descending thoracic/thoracoabdominal aneurysm.  She was referred to [facility] where she had been followed for some time and undergoing surveillance.  Per her  she has been hospitalized twice this year.  She was recently discharged following a bout of acute respiratory failure.  Her renal function has significantly worsened with a creatinine of 3.8/GFR of 11.  She is on chronic oxygen via nasal cannula secondary to advanced COPD/emphysema.  Overall she has had significant clinical deterioration.  She has elected to no longer follow with [facility].  Notes from [facility] have been reviewed prior to today's office visit.  She has elected to not proceed with any aneurysm repair even in the event of rupture.  She understands the almost certainty of immediate death with aneurysm rupture.  We had a lengthy discussion today in the office as to the utility of ongoing routine regular surveillance.  We agree that such routine surveillance should be at this point terminated/discontinued.  She may follow-up with our office on an as needed basis.  Of note she is nearing need for dialysis. She states that her doctor is arranging for dialysis access in near future. In meantime should she need dialysis she would require a temp. dialysis catheter.       Diagnoses and all orders for this visit:    Aneurysm of descending thoracic aorta without rupture (HCC)    Carotid stenosis, asymptomatic, bilateral    Pulmonary emphysema, unspecified emphysema type (HCC)    Chronic respiratory failure with hypoxia (HCC)    Other orders  -      "amLODIPine (NORVASC) 5 mg tablet; Take 5 mg by mouth daily  -     pregabalin (LYRICA) 75 mg capsule; take 1 capsule by mouth nightly  -     Trelegy Ellipta 200-62.5-25 MCG/ACT AEPB inhaler; Inhale 1 puff daily  -     sertraline (ZOLOFT) 100 mg tablet; Take 100 mg by mouth daily  -     ferrous sulfate 325 (65 Fe) mg tablet; Take 325 mg by mouth daily with breakfast          Subjective:      Patient ID: Vicki Barber is a 84 y.o. female.    Patient is here today to review results of a CT ABD/pelvis stone study done at [facility](study images in EPIC). Patient c/o abd pain after eating and hard to swallow. She denies any other abd pain. She also c/o chronic low back pain. Patient admits that she is no longer seeing Doctors at [facility]. Patient is taking ASA 81 mg and Rosuvastatin. Patient is a former smoker.     Vicki presents to the office for routine scheduled visit following recent hospitalization.          The following portions of the patient's history were reviewed and updated as appropriate: allergies, current medications, past family history, past medical history, past social history, past surgical history, and problem list.    Review of Systems   Constitutional: Negative.    HENT:  Positive for trouble swallowing.    Eyes: Negative.    Respiratory:  Positive for cough and shortness of breath (patient is on 5L O2 daily).    Cardiovascular: Negative.    Gastrointestinal:  Positive for abdominal pain (after eating).   Endocrine: Negative.    Genitourinary: Negative.    Musculoskeletal: Negative.    Skin: Negative.    Allergic/Immunologic: Positive for food allergies (eggs and fish).   Neurological: Negative.    Hematological: Negative.    Psychiatric/Behavioral: Negative.           Objective:      /72 (BP Location: Left arm, Patient Position: Sitting, Cuff Size: Standard)   Pulse 76   Ht 5' 3.5\" (1.613 m)   SpO2 92% Comment: 5 L O2  BMI 31.84 kg/m²          Physical Exam  Constitutional:       " General: She is not in acute distress.     Appearance: She is not toxic-appearing or diaphoretic.      Comments: Very frail appearing   HENT:      Head: Normocephalic and atraumatic.      Right Ear: External ear normal.      Left Ear: External ear normal.   Pulmonary:      Comments: On O2 via nasal cannula  Abdominal:      Palpations: Abdomen is soft.      Tenderness: There is no abdominal tenderness.   Musculoskeletal:      Right lower leg: Edema present.      Left lower leg: Edema present.   Neurological:      General: No focal deficit present.      Mental Status: She is alert.   Psychiatric:         Mood and Affect: Mood normal.         Behavior: Behavior normal.         Thought Content: Thought content normal.         Judgment: Judgment normal.

## 2024-07-26 DIAGNOSIS — Z99.2 CKD (CHRONIC KIDNEY DISEASE) STAGE V REQUIRING CHRONIC DIALYSIS (HCC): Primary | ICD-10-CM

## 2024-07-26 DIAGNOSIS — N18.6 CKD (CHRONIC KIDNEY DISEASE) STAGE V REQUIRING CHRONIC DIALYSIS (HCC): Primary | ICD-10-CM

## 2024-08-09 DIAGNOSIS — I12.0 BENIGN HYPERTENSION WITH CKD (CHRONIC KIDNEY DISEASE) STAGE V (HCC): Primary | ICD-10-CM

## 2024-08-09 DIAGNOSIS — N18.5 BENIGN HYPERTENSION WITH CKD (CHRONIC KIDNEY DISEASE) STAGE V (HCC): Primary | ICD-10-CM

## 2024-08-09 RX ORDER — AMLODIPINE BESYLATE 5 MG/1
5 TABLET ORAL 2 TIMES DAILY
Qty: 180 TABLET | Refills: 3 | Status: SHIPPED | OUTPATIENT
Start: 2024-08-09

## 2024-08-09 RX ORDER — TORSEMIDE 20 MG/1
TABLET ORAL
Qty: 225 TABLET | Refills: 3 | Status: SHIPPED | OUTPATIENT
Start: 2024-08-09

## 2024-08-09 RX ORDER — DOXAZOSIN 2 MG/1
2 TABLET ORAL
Qty: 90 TABLET | Refills: 3 | Status: SHIPPED | OUTPATIENT
Start: 2024-08-09

## 2024-08-09 RX ORDER — TORSEMIDE 20 MG/1
80 TABLET ORAL DAILY
Qty: 225 TABLET | Refills: 3 | Status: SHIPPED | OUTPATIENT
Start: 2024-08-09 | End: 2024-08-09 | Stop reason: SDUPTHER

## 2024-10-11 DIAGNOSIS — I12.0 BENIGN HYPERTENSION WITH CKD (CHRONIC KIDNEY DISEASE) STAGE V (HCC): ICD-10-CM

## 2024-10-11 DIAGNOSIS — N18.5 BENIGN HYPERTENSION WITH CKD (CHRONIC KIDNEY DISEASE) STAGE V (HCC): ICD-10-CM

## 2024-10-11 RX ORDER — DOXAZOSIN 4 MG/1
4 TABLET ORAL
Qty: 30 TABLET | Refills: 5 | Status: SHIPPED | OUTPATIENT
Start: 2024-10-11

## 2024-10-14 DIAGNOSIS — N18.6 BENIGN HYPERTENSION WITH ESRD (END-STAGE RENAL DISEASE) (HCC): Primary | ICD-10-CM

## 2024-10-14 DIAGNOSIS — I12.0 BENIGN HYPERTENSION WITH ESRD (END-STAGE RENAL DISEASE) (HCC): Primary | ICD-10-CM

## 2024-10-14 RX ORDER — METOPROLOL SUCCINATE 50 MG/1
50 TABLET, EXTENDED RELEASE ORAL DAILY
Qty: 90 TABLET | Refills: 3 | Status: SHIPPED | OUTPATIENT
Start: 2024-10-14

## 2024-10-24 ENCOUNTER — HOSPITAL ENCOUNTER (OUTPATIENT)
Dept: NON INVASIVE DIAGNOSTICS | Facility: CLINIC | Age: 84
Discharge: HOME | End: 2024-10-24
Attending: INTERNAL MEDICINE
Payer: COMMERCIAL

## 2024-10-24 DIAGNOSIS — Z99.2 CKD (CHRONIC KIDNEY DISEASE) STAGE V REQUIRING CHRONIC DIALYSIS (HCC): ICD-10-CM

## 2024-10-24 DIAGNOSIS — N18.6 CKD (CHRONIC KIDNEY DISEASE) STAGE V REQUIRING CHRONIC DIALYSIS (HCC): ICD-10-CM

## 2024-10-24 PROCEDURE — 93985 DUP-SCAN HEMO COMPL BI STD: CPT

## 2024-10-25 PROCEDURE — 93985 DUP-SCAN HEMO COMPL BI STD: CPT | Performed by: SURGERY

## 2024-10-28 DIAGNOSIS — N18.6 BENIGN HYPERTENSION WITH ESRD (END-STAGE RENAL DISEASE) (HCC): ICD-10-CM

## 2024-10-28 DIAGNOSIS — Z99.2 ESRD (END STAGE RENAL DISEASE) ON DIALYSIS (HCC): Primary | ICD-10-CM

## 2024-10-28 DIAGNOSIS — N18.5 BENIGN HYPERTENSION WITH CKD (CHRONIC KIDNEY DISEASE) STAGE V (HCC): ICD-10-CM

## 2024-10-28 DIAGNOSIS — I12.0 BENIGN HYPERTENSION WITH CKD (CHRONIC KIDNEY DISEASE) STAGE V (HCC): ICD-10-CM

## 2024-10-28 DIAGNOSIS — J44.9 CHRONIC OBSTRUCTIVE PULMONARY DISEASE, UNSPECIFIED COPD TYPE (HCC): ICD-10-CM

## 2024-10-28 DIAGNOSIS — I12.0 BENIGN HYPERTENSION WITH ESRD (END-STAGE RENAL DISEASE) (HCC): ICD-10-CM

## 2024-10-28 DIAGNOSIS — N18.6 ESRD (END STAGE RENAL DISEASE) ON DIALYSIS (HCC): Primary | ICD-10-CM

## 2024-10-28 RX ORDER — AMLODIPINE BESYLATE 5 MG/1
5 TABLET ORAL 2 TIMES DAILY
Qty: 180 TABLET | Refills: 3 | Status: SHIPPED | OUTPATIENT
Start: 2024-10-28 | End: 2024-11-04 | Stop reason: ALTCHOICE

## 2024-10-28 RX ORDER — TORSEMIDE 20 MG/1
TABLET ORAL
Qty: 225 TABLET | Refills: 3 | Status: SHIPPED | OUTPATIENT
Start: 2024-10-28

## 2024-10-29 ENCOUNTER — OFFICE VISIT (OUTPATIENT)
Dept: VASCULAR SURGERY | Facility: CLINIC | Age: 84
End: 2024-10-29
Payer: COMMERCIAL

## 2024-10-29 VITALS
HEIGHT: 64 IN | BODY MASS INDEX: 31.84 KG/M2 | OXYGEN SATURATION: 98 % | SYSTOLIC BLOOD PRESSURE: 124 MMHG | HEART RATE: 70 BPM | DIASTOLIC BLOOD PRESSURE: 82 MMHG

## 2024-10-29 DIAGNOSIS — I71.23 ANEURYSM OF DESCENDING THORACIC AORTA WITHOUT RUPTURE (HCC): ICD-10-CM

## 2024-10-29 DIAGNOSIS — N18.6 ESRD (END STAGE RENAL DISEASE) (HCC): ICD-10-CM

## 2024-10-29 DIAGNOSIS — I71.43 INFRARENAL ABDOMINAL AORTIC ANEURYSM (AAA) WITHOUT RUPTURE (HCC): Primary | ICD-10-CM

## 2024-10-29 PROCEDURE — 99214 OFFICE O/P EST MOD 30 MIN: CPT | Performed by: SURGERY

## 2024-10-29 RX ORDER — OXYCODONE HYDROCHLORIDE 5 MG/1
2.5 TABLET ORAL EVERY 6 HOURS PRN
COMMUNITY
Start: 2024-07-22

## 2024-10-29 RX ORDER — PANTOPRAZOLE SODIUM 40 MG/1
40 TABLET, DELAYED RELEASE ORAL
COMMUNITY
Start: 2024-08-14

## 2024-10-29 RX ORDER — TROLAMINE SALICYLATE 10 G/100G
1 CREAM TOPICAL DAILY
COMMUNITY
Start: 2024-07-15

## 2024-10-29 RX ORDER — NICOTINE POLACRILEX 2 MG
2 GUM BUCCAL DAILY
COMMUNITY

## 2024-10-29 NOTE — ASSESSMENT & PLAN NOTE
Lab Results   Component Value Date    EGFR 8 (L) 07/26/2024    EGFR 5 (L) 07/22/2024    EGFR 10 (L) 07/20/2024    CREATININE 5.09 (H) 07/26/2024    CREATININE 6.98 (H) 07/22/2024    CREATININE 4.34 (H) 07/20/2024   Vicki presents to the office accompanied by her  to discuss possible dialysis access creation.  She is a patient well-known to me from prior abdominal aortic aneurysm status post EVAR back in 2015 at outside facility in [place].  She subsequently developed aneurysmal degeneration of her thoracic/abdominal aorta.  She was subsequently referred to [facility] where she was undergoing surveillance of her thoracoabdominal aneurysm by Dr. Todd.  She has since elected to discontinue ongoing surveillance due to her progression of cardiopulmonary disease.  Of note patient is on chronic oxygen currently at 6 L via nasal cannula.    Unfortunately her chronic kidney disease has progressed to end-stage renal disease and has been initiated on dialysis since July.  At one point she was being considered for peritoneal dialysis however decision was made that due to the underlying cardiopulmonary disease would not be a good candidate as it may further compromise her pulmonary function.  She was seen and evaluated by Dr. Kenan Barragan at [facility] back in May for fistula creation.  Overall impression was that while fistula is technically feasible that  given her cardiopulmonary comorbidities that catheter may be her best option at this point.  She is now here in the office to further discuss dialysis access creation.  I did go over the potential risk of remaining with a catheter to include life-threatening infection versus proceeding with left upper extremity AV graft creation and the inherent potential intraoperative/perioperative risks including bleeding, need for unexpected endotracheal intubation with subsequent inability to free from the ventilator, and intraoperative death.  After weighing  the risk and benefits of remaining with the current tunneled catheter versus undergoing additional surgical procedure patient has at this time elected to forego any additional surgeries and prefers to continue dialysis with existing catheter.

## 2024-10-29 NOTE — LETTER
October 29, 2024     [facility]  [address]  [address]  [facility] [address]    Patient: Vicki Barber   YOB: 1940   Date of Visit: 10/29/2024       Dear Dr. Dumont:    Thank you for referring Vicki Barber to me for evaluation. Below are the relevant portions of my assessment and plan of care.         If you have questions, please do not hesitate to call me. I look forward to following Vicki along with you.         Sincerely,        Matilde Chan, DO        CC: No Recipients

## 2024-11-04 DIAGNOSIS — I1A.0 RESISTANT HYPERTENSION: Primary | ICD-10-CM

## 2024-11-04 RX ORDER — NIFEDIPINE 60 MG/1
60 TABLET, EXTENDED RELEASE ORAL DAILY
Qty: 30 TABLET | Refills: 3 | Status: SHIPPED | OUTPATIENT
Start: 2024-11-04 | End: 2024-11-11 | Stop reason: DRUGHIGH

## 2024-11-11 DIAGNOSIS — I12.0 BENIGN HYPERTENSION WITH ESRD (END-STAGE RENAL DISEASE) (HCC): ICD-10-CM

## 2024-11-11 DIAGNOSIS — N18.6 ESRD (END STAGE RENAL DISEASE) (HCC): Primary | ICD-10-CM

## 2024-11-11 DIAGNOSIS — N18.6 BENIGN HYPERTENSION WITH ESRD (END-STAGE RENAL DISEASE) (HCC): ICD-10-CM

## 2024-11-11 DIAGNOSIS — I1A.0 RESISTANT HYPERTENSION: ICD-10-CM

## 2024-11-11 RX ORDER — NIFEDIPINE 90 MG/1
90 TABLET, FILM COATED, EXTENDED RELEASE ORAL DAILY
Qty: 90 TABLET | Refills: 3 | Status: SHIPPED | OUTPATIENT
Start: 2024-11-11

## 2024-11-18 DIAGNOSIS — N18.6 ESRD (END STAGE RENAL DISEASE) ON DIALYSIS (HCC): ICD-10-CM

## 2024-11-18 DIAGNOSIS — N18.6 BENIGN HYPERTENSION WITH ESRD (END-STAGE RENAL DISEASE) (HCC): ICD-10-CM

## 2024-11-18 DIAGNOSIS — I12.0 BENIGN HYPERTENSION WITH ESRD (END-STAGE RENAL DISEASE) (HCC): ICD-10-CM

## 2024-11-18 DIAGNOSIS — Z99.2 ESRD (END STAGE RENAL DISEASE) ON DIALYSIS (HCC): ICD-10-CM

## 2024-11-19 RX ORDER — TORSEMIDE 20 MG/1
TABLET ORAL
Qty: 675 TABLET | Refills: 2 | Status: SHIPPED | OUTPATIENT
Start: 2024-11-19

## (undated) DEVICE — FLUID MANAGEMENT KIT - IR

## (undated) DEVICE — GLOVE INDICATOR PI UNDERGLOVE SZ 8 BLUE

## (undated) DEVICE — FLEXOR TUOHY-BORST SIDE-ARM INTRODUCER SHUTTLE SELECT,: Brand: FLEXOR

## (undated) DEVICE — 4F TEMPO AQUA .038 INCHES 125CM VERT: Brand: TEMPO AQUA

## (undated) DEVICE — 3M™ TEGADERM™ TRANSPARENT FILM DRESSING FRAME STYLE, 1624W, 2-3/8 IN X 2-3/4 IN (6 CM X 7 CM), 100/CT 4CT/CASE: Brand: 3M™ TEGADERM™

## (undated) DEVICE — STOPCOCK 4 WAY LL HIGH PRESSURE

## (undated) DEVICE — SPONGE GAUZE 2 X 2 4PLY STRL

## (undated) DEVICE — Device

## (undated) DEVICE — PINNACLE R/O II INTRODUCER SHEATH WITH RADIOPAQUE MARKER: Brand: PINNACLE

## (undated) DEVICE — MICROPUNCTURE 501

## (undated) DEVICE — STERILE ICS CARDIOVASCULAR PK: Brand: CARDINAL HEALTH

## (undated) DEVICE — COVER PROBE INTRAOPERATIVE 6 X 96 IN

## (undated) DEVICE — GLOVE INDICATOR PI UNDERGLOVE SZ 7.5 BLUE

## (undated) DEVICE — ACE WRAP 6 IN STERILE

## (undated) DEVICE — INFLATION DEVICE BASIX 30ATM

## (undated) DEVICE — CATH DIAG 5FR .035 65CM 6S OMMI-FLUSH

## (undated) DEVICE — FLEXCIL HIGH PRESSURE CONTRAST INJECTION LINE: Brand: NAMIC

## (undated) DEVICE — RADIFOCUS TORQUE DEVICE MULTI-TORQUE VISE: Brand: RADIFOCUS TORQUE DEVICE

## (undated) DEVICE — SNAP KOVER: Brand: UNBRANDED

## (undated) DEVICE — GLOVE INDICATOR PI UNDERGLOVE SZ 7 BLUE